# Patient Record
Sex: FEMALE | Race: WHITE | Employment: FULL TIME | URBAN - NONMETROPOLITAN AREA
[De-identification: names, ages, dates, MRNs, and addresses within clinical notes are randomized per-mention and may not be internally consistent; named-entity substitution may affect disease eponyms.]

---

## 2017-01-06 PROBLEM — G89.29 INSOMNIA SECONDARY TO CHRONIC PAIN: Status: ACTIVE | Noted: 2017-01-06

## 2017-01-06 PROBLEM — I10 ESSENTIAL HYPERTENSION: Status: ACTIVE | Noted: 2017-01-06

## 2017-01-06 PROBLEM — E03.9 HYPOTHYROIDISM: Status: ACTIVE | Noted: 2017-01-06

## 2017-01-06 PROBLEM — G43.909 MIGRAINE HEADACHE: Status: ACTIVE | Noted: 2017-01-06

## 2017-01-06 PROBLEM — G47.01 INSOMNIA SECONDARY TO CHRONIC PAIN: Status: ACTIVE | Noted: 2017-01-06

## 2017-07-14 ENCOUNTER — HOSPITAL ENCOUNTER (OUTPATIENT)
Dept: GENERAL RADIOLOGY | Age: 47
Discharge: HOME | End: 2017-07-14
Payer: COMMERCIAL

## 2017-07-14 ENCOUNTER — HOSPITAL ENCOUNTER (OUTPATIENT)
Age: 47
Discharge: HOME | End: 2017-07-14
Payer: COMMERCIAL

## 2017-07-14 DIAGNOSIS — R05.3 CHRONIC COUGH: ICD-10-CM

## 2017-07-14 PROBLEM — F41.9 ANXIETY: Status: ACTIVE | Noted: 2017-07-14

## 2017-07-14 PROCEDURE — 71020 XR CHEST STANDARD TWO VW: CPT

## 2018-01-03 ENCOUNTER — OFFICE VISIT (OUTPATIENT)
Dept: FAMILY MEDICINE CLINIC | Age: 48
End: 2018-01-03
Payer: COMMERCIAL

## 2018-01-03 VITALS
HEIGHT: 67 IN | WEIGHT: 176 LBS | SYSTOLIC BLOOD PRESSURE: 132 MMHG | BODY MASS INDEX: 27.62 KG/M2 | DIASTOLIC BLOOD PRESSURE: 84 MMHG

## 2018-01-03 DIAGNOSIS — E78.49 OTHER HYPERLIPIDEMIA: ICD-10-CM

## 2018-01-03 DIAGNOSIS — E03.8 OTHER SPECIFIED HYPOTHYROIDISM: ICD-10-CM

## 2018-01-03 DIAGNOSIS — I10 ESSENTIAL HYPERTENSION: Primary | ICD-10-CM

## 2018-01-03 PROCEDURE — 1036F TOBACCO NON-USER: CPT | Performed by: FAMILY MEDICINE

## 2018-01-03 PROCEDURE — G8482 FLU IMMUNIZE ORDER/ADMIN: HCPCS | Performed by: FAMILY MEDICINE

## 2018-01-03 PROCEDURE — 99214 OFFICE O/P EST MOD 30 MIN: CPT | Performed by: FAMILY MEDICINE

## 2018-01-03 PROCEDURE — G8427 DOCREV CUR MEDS BY ELIG CLIN: HCPCS | Performed by: FAMILY MEDICINE

## 2018-01-03 PROCEDURE — G8417 CALC BMI ABV UP PARAM F/U: HCPCS | Performed by: FAMILY MEDICINE

## 2018-01-03 RX ORDER — LOSARTAN POTASSIUM AND HYDROCHLOROTHIAZIDE 12.5; 5 MG/1; MG/1
1 TABLET ORAL DAILY
Qty: 90 TABLET | Refills: 1 | Status: SHIPPED | OUTPATIENT
Start: 2018-01-03 | End: 2018-07-05 | Stop reason: SDUPTHER

## 2018-01-03 NOTE — PATIENT INSTRUCTIONS
PLAN:  Labs reviewed with patient, I am overall pleased with these results. Her potassium level however is low, I encourage for her to increase the potassium in her diet. If this does not help, I can add a potassium supplement. I will discontinue her Chlorthalidone and start her on Losartan 50-12.5mg to help better control her triglycerides. I am not too concerned with this level at this time. I also would like for her to increase her fish oils. I will recheck a fasting lipid in a few months to see how this has changed. I suggest for her to increase her aerobic exercise.

## 2018-01-03 NOTE — PROGRESS NOTES
[address]  [place] [phone]  Dept: [phone]    Korin Rivera is a 52 y.o. female here for 6 Month Follow-Up; Hypertension; and Hyperlipidemia    HPI:  HYPERTENSION  She is not exercising and is adherent to a low-salt diet.  Blood pressure is not being monitored at home. Cardiac symptoms: none. Patient denies: chest pain, dyspnea and palpitations.     HYPERLIPIDEMIA    Medication compliance: compliant all of the time. Patient is following a low fat, low cholesterol diet. She is not exercising regularly. Prior to Admission medications    Medication Sig Start Date End Date Taking? Authorizing Provider   losartan-hydrochlorothiazide (HYZAAR) 50-12.5 MG per tablet Take 1 tablet by mouth daily 1/3/18  Yes Silvino Johnson MD   DULoxetine (CYMBALTA) 30 MG extended release capsule TAKE 1 CAPSULE BY MOUTH DAILY 12/18/17  Yes Silvino Johnson MD   sertraline (ZOLOFT) 50 MG tablet TAKE 1 TABLET BY MOUTH DAILY 10/9/17  Yes Silvino Johnson MD   simvastatin (ZOCOR) 40 MG tablet TAKE 1 TABLET BY MOUTH DAILY IN THE EVENING 10/9/17  Yes Silvino Johnson MD   Levothyroxine Sodium 100 MCG CAPS Take 1 capsule by mouth Daily 7/14/17  Yes Silvino Johnson MD   butalbital-aspirin-caffeine [facility]) -54 MG capsule Take 1 capsule by mouth every 4 hours as needed for Headaches 7/14/17  Yes Silvino Johnson MD   ALPRAZolam Denice Lisa) 0.5 MG tablet TAKE 1 TABLET BY MOUTH EVERY 6 HOURS AS NEEDED FOR SLEEP OR ANXIETY 5/17/17  Yes Silvino Johnson MD   ibuprofen (ADVIL;MOTRIN) 800 MG tablet TAKE 1 TABLET EVERY 8 HOURS AS NEEDED 5/4/16  Yes Silvino Johnson MD     ROS:  General Constitutional: Denies chills. Denies fever. Denies headache. Denies lightheadedness. Ophthalmologic: Denies blurred vision. ENT: Denies nasal congestion. Denies sore throat. Denies ear pain and pressure. Respiratory: Denies cough. Denies shortness of breath. Denies wheezing.   Cardiovascular: Denies chest pain at needed for Headaches 30 capsule 3    ALPRAZolam (XANAX) 0.5 MG tablet TAKE 1 TABLET BY MOUTH EVERY 6 HOURS AS NEEDED FOR SLEEP OR ANXIETY 30 tablet 0    ibuprofen (ADVIL;MOTRIN) 800 MG tablet TAKE 1 TABLET EVERY 8 HOURS AS NEEDED 90 tablet 0     No current facility-administered medications for this visit. No Known Allergies    PHYSICAL EXAM:    /84 (Site: Left Arm, Position: Sitting, Cuff Size: Medium Adult)   Ht 5' 7\" (1.702 m)   Wt 176 lb (79.8 kg)   LMP 09/30/2017 (Approximate)   BMI 27.57 kg/m²   Wt Readings from Last 3 Encounters:   01/03/18 176 lb (79.8 kg)   07/14/17 173 lb (78.5 kg)   01/06/17 170 lb (77.1 kg)     BP Readings from Last 3 Encounters:   01/03/18 132/84   07/14/17 122/62   01/06/17 132/72     General Appearance: in no acute distress, well developed, well nourished. Eyes: pupils equal, round reactive to light and accommodation. Ears: normal canal and TM's. Nose: nares patent, no lesions. Oral Cavity: mucosa moist.  Throat: clear. Neck/Thyroid: neck supple, full range of motion, no cervical lymphadenopathy, no thyromegaly or carotid bruits. fullness  Skin: warm and dry. No suspicious lesions. Evulsion L first finger  Heart: regular rate and rhythm. No murmurs. S1, S2 normal, no gallops. Lungs: clear to auscultation bilaterally. Abdomen: bowel sounds present, soft, nontender, nondistended, no masses or organomegaly. Musculoskeletal: normal, full range of motion in knees and hips, no swelling or tenderness. Extremities: no cyanosis or edema. Peripheral Pulses: 2+ throughout, symetric. Neurologic: nonfocal, motor strength normal upper and lower extremities, sensory exam intact. Psych: normal affect, speech fluent. ASSESSMENT:  1. Essential hypertension     2. Other hyperlipidemia  Basic Metabolic Panel    Lipid Panel    ALT    AST    Basic Metabolic Panel    CBC    CRP,High Sensitivity    Lipid Panel   3.  Other specified hypothyroidism  T4    TSH without Reflex PLAN:  Labs reviewed with patient, I am overall pleased with these results. Her potassium level however is low, I encourage for her to increase the potassium in her diet. If this does not help, I can add a potassium supplement. I will discontinue her Chlorthalidone and start her on Losartan 50-12.5mg to help better control her triglycerides. I am not too concerned with this level at this time. I also would like for her to increase her fish oils. I will recheck a fasting lipid in a few months to see how this has changed. I suggest for her to increase her aerobic exercise. Orders Placed This Encounter   Procedures    Basic Metabolic Panel     Standing Status:   Future     Standing Expiration Date:   1/3/2019    Lipid Panel     Standing Status:   Future     Standing Expiration Date:   1/3/2019     Order Specific Question:   Is Patient Fasting?/# of Hours     Answer:   no fasting required    ALT     Standing Status:   Future     Standing Expiration Date:   1/3/2019    AST     Standing Status:   Future     Standing Expiration Date:   1/3/2019    Basic Metabolic Panel     Standing Status:   Future     Standing Expiration Date:   1/3/2019    CBC     Standing Status:   Future     Standing Expiration Date:   1/3/2019    CRP,High Sensitivity     Standing Status:   Future     Standing Expiration Date:   1/3/2019    Lipid Panel     Standing Status:   Future     Standing Expiration Date:   1/3/2019     Order Specific Question:   Is Patient Fasting?/# of Hours     Answer:   no fasting required    T4     Standing Status:   Future     Standing Expiration Date:   1/3/2019    TSH without Reflex     Standing Status:   Future     Standing Expiration Date:   1/3/2019     Orders Placed This Encounter   Medications    losartan-hydrochlorothiazide (HYZAAR) 50-12.5 MG per tablet     Sig: Take 1 tablet by mouth daily     Dispense:  90 tablet     Refill:  1     Scribed by: JENNIFER Porter, [id number]

## 2018-01-23 ENCOUNTER — HOSPITAL ENCOUNTER (OUTPATIENT)
Dept: WOMENS IMAGING | Age: 48
Discharge: HOME | End: 2018-01-23
Payer: COMMERCIAL

## 2018-01-23 DIAGNOSIS — Z12.39 SCREENING BREAST EXAMINATION: ICD-10-CM

## 2018-01-23 PROCEDURE — 77067 SCR MAMMO BI INCL CAD: CPT

## 2018-04-04 LAB
ANION GAP SERPL CALCULATED.3IONS-SCNC: 16 MEQ/L (ref 10–19)
BUN BLDV-MCNC: 15 MG/DL (ref 8–23)
CALCIUM SERPL-MCNC: 9.9 MG/DL (ref 8.5–10.5)
CHLORIDE BLD-SCNC: 98 MEQ/L (ref 95–107)
CHOLESTEROL/HDL RATIO: 3.8
CHOLESTEROL: 196 MG/DL
CO2: 28 MEQ/L (ref 19–31)
CREAT SERPL-MCNC: 0.7 MG/DL (ref 0.6–1.3)
EGFR AFRICAN AMERICAN: 119.6 ML/MIN/1.73 M2
EGFR IF NONAFRICAN AMERICAN: 103.2 ML/MIN/1.73 M2
GLUCOSE: 73 MG/DL (ref 70–99)
HDLC SERPL-MCNC: 51 MG/DL
LDL CHOLESTEROL CALCULATED: 83 MG/DL
LDL/HDL RATIO: 1.6
POTASSIUM SERPL-SCNC: 4.1 MEQ/L (ref 3.5–5.4)
SODIUM BLD-SCNC: 142 MEQ/L (ref 135–146)
TRIGL SERPL-MCNC: 308 MG/DL
VLDLC SERPL CALC-MCNC: 62 MG/DL

## 2018-06-29 LAB
ABSOLUTE BASO #: 0.1 K/UL (ref 0–0.1)
ABSOLUTE EOS #: 0.3 K/UL (ref 0.1–0.4)
ABSOLUTE LYMPH #: 2.1 K/UL (ref 0.8–5.2)
ABSOLUTE MONO #: 0.4 K/UL (ref 0.1–0.9)
ABSOLUTE NEUT #: 3.7 K/UL (ref 1.3–9.1)
ALT SERPL-CCNC: 24 U/L (ref 5–40)
ANION GAP SERPL CALCULATED.3IONS-SCNC: 14 MEQ/L (ref 10–19)
AST SERPL-CCNC: 21 U/L (ref 9–40)
BASOPHILS RELATIVE PERCENT: 1.2 %
BUN BLDV-MCNC: 13 MG/DL (ref 8–23)
CALCIUM SERPL-MCNC: 9.8 MG/DL (ref 8.5–10.5)
CHLORIDE BLD-SCNC: 98 MEQ/L (ref 95–107)
CHOLESTEROL/HDL RATIO: 3.7
CHOLESTEROL: 196 MG/DL
CO2: 30 MEQ/L (ref 19–31)
CREAT SERPL-MCNC: 0.7 MG/DL (ref 0.6–1.3)
EGFR AFRICAN AMERICAN: 118.7 ML/MIN/1.73 M2
EGFR IF NONAFRICAN AMERICAN: 102.5 ML/MIN/1.73 M2
EOSINOPHILS RELATIVE PERCENT: 4.4 %
GLUCOSE: 100 MG/DL (ref 70–99)
HCT VFR BLD CALC: 44.5 % (ref 36–48)
HDLC SERPL-MCNC: 53 MG/DL
HEMOGLOBIN: 15.3 G/DL (ref 12–16)
HIGH SENSITIVE C-REACTIVE PROTEIN: 1.49 MG/L
LDL CHOLESTEROL CALCULATED: 90 MG/DL
LDL/HDL RATIO: 1.7
LYMPHOCYTE %: 32.1 %
MCH RBC QN AUTO: 30.5 PG (ref 27–34)
MCHC RBC AUTO-ENTMCNC: 34.4 G/DL (ref 31–36)
MCV RBC AUTO: 88.8 FL (ref 80–100)
MONOCYTES # BLD: 6.3 %
NEUTROPHILS RELATIVE PERCENT: 55.8 %
PDW BLD-RTO: 12.3 % (ref 10.8–14.8)
PLATELETS: 306 K/UL (ref 150–450)
POTASSIUM SERPL-SCNC: 4.3 MEQ/L (ref 3.5–5.4)
RBC: 5.01 M/UL (ref 4–5.5)
SODIUM BLD-SCNC: 142 MEQ/L (ref 135–146)
T4 TOTAL: 8.2 UG/DL (ref 4.5–12)
TRIGL SERPL-MCNC: 264 MG/DL
TSH SERPL DL<=0.05 MIU/L-ACNC: 1.55 UIU/ML (ref 0.4–4.1)
VLDLC SERPL CALC-MCNC: 53 MG/DL
WBC: 6.7 K/UL (ref 3.7–10.8)

## 2018-07-06 ENCOUNTER — OFFICE VISIT (OUTPATIENT)
Dept: FAMILY MEDICINE CLINIC | Age: 48
End: 2018-07-06
Payer: COMMERCIAL

## 2018-07-06 VITALS
WEIGHT: 181.4 LBS | BODY MASS INDEX: 28.47 KG/M2 | HEIGHT: 67 IN | DIASTOLIC BLOOD PRESSURE: 64 MMHG | SYSTOLIC BLOOD PRESSURE: 112 MMHG

## 2018-07-06 DIAGNOSIS — E78.49 OTHER HYPERLIPIDEMIA: ICD-10-CM

## 2018-07-06 DIAGNOSIS — G47.33 OSA (OBSTRUCTIVE SLEEP APNEA): ICD-10-CM

## 2018-07-06 DIAGNOSIS — I10 ESSENTIAL HYPERTENSION: Primary | ICD-10-CM

## 2018-07-06 DIAGNOSIS — E03.8 OTHER SPECIFIED HYPOTHYROIDISM: ICD-10-CM

## 2018-07-06 PROCEDURE — G8427 DOCREV CUR MEDS BY ELIG CLIN: HCPCS | Performed by: FAMILY MEDICINE

## 2018-07-06 PROCEDURE — 1036F TOBACCO NON-USER: CPT | Performed by: FAMILY MEDICINE

## 2018-07-06 PROCEDURE — G8417 CALC BMI ABV UP PARAM F/U: HCPCS | Performed by: FAMILY MEDICINE

## 2018-07-06 PROCEDURE — 99214 OFFICE O/P EST MOD 30 MIN: CPT | Performed by: FAMILY MEDICINE

## 2018-07-06 RX ORDER — LOSARTAN POTASSIUM AND HYDROCHLOROTHIAZIDE 12.5; 5 MG/1; MG/1
TABLET ORAL
Qty: 90 TABLET | Refills: 3 | Status: SHIPPED | OUTPATIENT
Start: 2018-07-06 | End: 2019-07-04 | Stop reason: SDUPTHER

## 2018-07-06 ASSESSMENT — PATIENT HEALTH QUESTIONNAIRE - PHQ9
2. FEELING DOWN, DEPRESSED OR HOPELESS: 0
1. LITTLE INTEREST OR PLEASURE IN DOING THINGS: 0
SUM OF ALL RESPONSES TO PHQ QUESTIONS 1-9: 0
SUM OF ALL RESPONSES TO PHQ9 QUESTIONS 1 & 2: 0

## 2018-07-06 NOTE — PROGRESS NOTES
I, MADELEINE Mena, am scribing for and in the presence of Dr. Zoila Wood. 07/06/189:27 am [facility]  [address]  [place] [phone]  Dept: [phone]    Ramona Cummings is a 50 y.o. female here for 6 Month Follow-Up; Hypertension; and Hyperlipidemia      HPI:  HYPERTENSION:  Medication compliance: compliant all of the time. Medication Therapy: losartan HCTZ  She is not exercising. She is adherent to a low-sodium diet. Blood pressure is not being monitored at home. Patient reports that She has limited alcohol intake. Does the patient have sleep apnea? Yes, did not tolerate the cpap  The patient's most recent LDL is below 190, which was checked on 06/28/18. HYPERLIPIDEMIA:  Medication compliance: compliant all of the time. Medication Therapy: simvastatin (Zocor), fish oil  Patient is  following a low fat, low cholesterol diet. She is not exercising regularly. Prior to Admission medications    Medication Sig Start Date End Date Taking?  Authorizing Provider   levothyroxine (SYNTHROID) 100 MCG tablet TAKE 1 TABLET BY MOUTH EVERY DAY 6/21/18  Yes Zoila Wood MD   ibuprofen (ADVIL;MOTRIN) 800 MG tablet TAKE 1 TABLET EVERY 8 HOURS AS NEEDED 4/12/18  Yes MD zulay Cruzalbital-acetaminophen-caffeine (FIORICET, ESGIC) -22 MG per tablet TAKE 1 TABLET BY MOUTH DAILY AS NEEDED FOR PAIN 4/9/18  Yes Zoila Wood MD   DULoxetine (CYMBALTA) 30 MG extended release capsule TAKE 1 CAPSULE BY MOUTH DAILY 12/18/17  Yes Zoila Wood MD   sertraline (ZOLOFT) 50 MG tablet TAKE 1 TABLET BY MOUTH DAILY 10/9/17  Yes Zoila Wood MD   simvastatin (ZOCOR) 40 MG tablet TAKE 1 TABLET BY MOUTH DAILY IN THE EVENING 10/9/17  Yes Zoila Wood MD   losartan-hydrochlorothiazide (HYZAAR) 50-12.5 MG per tablet TAKE 1 TABLET BY MOUTH EVERY DAY 7/6/18   MD zulay Cruzalbital-aspirin-caffeine [facility]) -60 MG capsule Take 1 capsule by mouth General Appearance: in no acute distress, well developed, well nourished. Eyes: pupils equal, round reactive to light and accommodation. Ears: normal canal and TM's. Nose: nares patent, no lesions. Oral Cavity: mucosa moist.  Throat: clear. Neck/Thyroid: neck supple, full range of motion, no cervical lymphadenopathy, no thyromegaly or carotid bruits. Skin: warm and dry. No suspicious lesions. Heart: regular rate and rhythm. No murmurs. S1, S2 normal, no gallops. Rate: 85.   Lungs: clear to auscultation bilaterally. Abdomen: bowel sounds present, soft, nontender, nondistended, no masses or organomegaly. Musculoskeletal: normal, full range of motion in knees and hips, no swelling or tenderness. Extremities: no cyanosis or edema. Peripheral Pulses: 2+ throughout, symetric. Neurologic: nonfocal, motor strength normal upper and lower extremities, sensory exam intact. Psych: normal affect, speech fluent. ASSESSMENT:   Diagnosis Orders   1. Essential hypertension     2. Other hyperlipidemia  ALT    AST    Basic Metabolic Panel    CBC    CRP,High Sensitivity    Lipid Panel   3. Other specified hypothyroidism  T4    TSH without Reflex   4. DANNY (obstructive sleep apnea)  Sleep Study with PAP Titration       PLAN:  Tdap: Chip Armando, [facility]. I commend her on utilizing meditation for stress relief and relaxation. I also encourage her to try yoga to help with relaxation and mindfulness. We also discuss the relaxation effects of aerobic activity. We discuss her sleep habits. Short sleep latency indicates sleep deprivation. The problem is if she is not getting effective sleep with 8-10 hours. It has been several years since she used a cpap. The technology of these machines have changed quite a bit since that time. I would recommend that she have a repeat sleep study and be fitted again to see if she can tolerate this any better.       We also review what she can do to decrease her risks for developing dementia. I encourage her to improve mindfulness and do the things necessary for heart health. I review her lab results. Her triglycerides continue to be elevated but have come down. I encourage her to restrict carbs and increase aerobic activity to help with this. All other labs look okay. I will see her back in 6 months. Orders Placed This Encounter   Procedures    ALT     Standing Status:   Future     Standing Expiration Date:   7/6/2019    AST     Standing Status:   Future     Standing Expiration Date:   7/6/2019    Basic Metabolic Panel     Standing Status:   Future     Standing Expiration Date:   7/6/2019    CBC     Standing Status:   Future     Standing Expiration Date:   7/6/2019   Razia Slimmer CRP,High Sensitivity     Standing Status:   Future     Standing Expiration Date:   7/6/2019    Lipid Panel     Standing Status:   Future     Standing Expiration Date:   7/6/2019     Order Specific Question:   Is Patient Fasting?/# of Hours     Answer:   no fasting required    T4     Standing Status:   Future     Standing Expiration Date:   7/6/2019    TSH without Reflex     Standing Status:   Future     Standing Expiration Date:   7/6/2019    Sleep Study with PAP Titration     Standing Status:   Future     Standing Expiration Date:   7/6/2019     Order Specific Question:   Sleep Study Titration Type     Answer:   CPAP     Order Specific Question:   Location For Sleep Study     Answer:   [facility]     Order Specific Question:   Select a sleep lab location     Answer:   [facility]     No orders of the defined types were placed in this encounter. I, Dr. Romana Dinning, personally performed the services described in this documentation as scribed by MADELEINE Mendiola in my presence, and it is both accurate and complete.

## 2018-07-10 DIAGNOSIS — G47.33 OSA (OBSTRUCTIVE SLEEP APNEA): Primary | ICD-10-CM

## 2018-11-06 ENCOUNTER — OFFICE VISIT (OUTPATIENT)
Dept: FAMILY MEDICINE CLINIC | Age: 48
End: 2018-11-06
Payer: COMMERCIAL

## 2018-11-06 VITALS
BODY MASS INDEX: 29.19 KG/M2 | HEIGHT: 67 IN | DIASTOLIC BLOOD PRESSURE: 86 MMHG | SYSTOLIC BLOOD PRESSURE: 132 MMHG | WEIGHT: 186 LBS

## 2018-11-06 DIAGNOSIS — G47.33 OSA (OBSTRUCTIVE SLEEP APNEA): ICD-10-CM

## 2018-11-06 DIAGNOSIS — F33.9 EPISODE OF RECURRENT MAJOR DEPRESSIVE DISORDER, UNSPECIFIED DEPRESSION EPISODE SEVERITY (HCC): Primary | ICD-10-CM

## 2018-11-06 DIAGNOSIS — G47.00 INSOMNIA, UNSPECIFIED TYPE: ICD-10-CM

## 2018-11-06 DIAGNOSIS — Z13.31 POSITIVE DEPRESSION SCREENING: ICD-10-CM

## 2018-11-06 DIAGNOSIS — F41.9 ANXIETY: ICD-10-CM

## 2018-11-06 PROCEDURE — 1036F TOBACCO NON-USER: CPT | Performed by: FAMILY MEDICINE

## 2018-11-06 PROCEDURE — G8417 CALC BMI ABV UP PARAM F/U: HCPCS | Performed by: FAMILY MEDICINE

## 2018-11-06 PROCEDURE — G8484 FLU IMMUNIZE NO ADMIN: HCPCS | Performed by: FAMILY MEDICINE

## 2018-11-06 PROCEDURE — 99214 OFFICE O/P EST MOD 30 MIN: CPT | Performed by: FAMILY MEDICINE

## 2018-11-06 PROCEDURE — G8427 DOCREV CUR MEDS BY ELIG CLIN: HCPCS | Performed by: FAMILY MEDICINE

## 2018-11-06 PROCEDURE — G8431 POS CLIN DEPRES SCRN F/U DOC: HCPCS | Performed by: FAMILY MEDICINE

## 2018-11-06 RX ORDER — ALPRAZOLAM 0.25 MG/1
0.12 TABLET ORAL 3 TIMES DAILY PRN
Qty: 60 TABLET | Refills: 0 | Status: SHIPPED | OUTPATIENT
Start: 2018-11-06 | End: 2019-02-02 | Stop reason: SDUPTHER

## 2018-11-06 RX ORDER — ALPRAZOLAM 0.5 MG/1
.25-.5 TABLET ORAL NIGHTLY PRN
COMMUNITY
End: 2019-12-13 | Stop reason: SDUPTHER

## 2018-11-06 ASSESSMENT — PATIENT HEALTH QUESTIONNAIRE - PHQ9
2. FEELING DOWN, DEPRESSED OR HOPELESS: 3
1. LITTLE INTEREST OR PLEASURE IN DOING THINGS: 3
3. TROUBLE FALLING OR STAYING ASLEEP: 3
6. FEELING BAD ABOUT YOURSELF - OR THAT YOU ARE A FAILURE OR HAVE LET YOURSELF OR YOUR FAMILY DOWN: 3
4. FEELING TIRED OR HAVING LITTLE ENERGY: 3
SUM OF ALL RESPONSES TO PHQ QUESTIONS 1-9: 18
7. TROUBLE CONCENTRATING ON THINGS, SUCH AS READING THE NEWSPAPER OR WATCHING TELEVISION: 3
9. THOUGHTS THAT YOU WOULD BE BETTER OFF DEAD, OR OF HURTING YOURSELF: 0
8. MOVING OR SPEAKING SO SLOWLY THAT OTHER PEOPLE COULD HAVE NOTICED. OR THE OPPOSITE, BEING SO FIGETY OR RESTLESS THAT YOU HAVE BEEN MOVING AROUND A LOT MORE THAN USUAL: 0
SUM OF ALL RESPONSES TO PHQ QUESTIONS 1-9: 18
5. POOR APPETITE OR OVEREATING: 0
10. IF YOU CHECKED OFF ANY PROBLEMS, HOW DIFFICULT HAVE THESE PROBLEMS MADE IT FOR YOU TO DO YOUR WORK, TAKE CARE OF THINGS AT HOME, OR GET ALONG WITH OTHER PEOPLE: 2
SUM OF ALL RESPONSES TO PHQ9 QUESTIONS 1 & 2: 6

## 2018-11-27 ENCOUNTER — OFFICE VISIT (OUTPATIENT)
Dept: FAMILY MEDICINE CLINIC | Age: 48
End: 2018-11-27
Payer: COMMERCIAL

## 2018-11-27 VITALS
WEIGHT: 186 LBS | DIASTOLIC BLOOD PRESSURE: 76 MMHG | BODY MASS INDEX: 29.19 KG/M2 | SYSTOLIC BLOOD PRESSURE: 120 MMHG | HEIGHT: 67 IN

## 2018-11-27 DIAGNOSIS — F32.4 MAJOR DEPRESSIVE DISORDER IN PARTIAL REMISSION, UNSPECIFIED WHETHER RECURRENT (HCC): ICD-10-CM

## 2018-11-27 DIAGNOSIS — G47.33 OSA (OBSTRUCTIVE SLEEP APNEA): Primary | ICD-10-CM

## 2018-11-27 PROCEDURE — 99213 OFFICE O/P EST LOW 20 MIN: CPT | Performed by: FAMILY MEDICINE

## 2018-11-27 PROCEDURE — 1036F TOBACCO NON-USER: CPT | Performed by: FAMILY MEDICINE

## 2018-11-27 PROCEDURE — G8427 DOCREV CUR MEDS BY ELIG CLIN: HCPCS | Performed by: FAMILY MEDICINE

## 2018-11-27 PROCEDURE — G8417 CALC BMI ABV UP PARAM F/U: HCPCS | Performed by: FAMILY MEDICINE

## 2018-11-27 PROCEDURE — G8484 FLU IMMUNIZE NO ADMIN: HCPCS | Performed by: FAMILY MEDICINE

## 2018-11-27 ASSESSMENT — PATIENT HEALTH QUESTIONNAIRE - PHQ9
SUM OF ALL RESPONSES TO PHQ QUESTIONS 1-9: 0
1. LITTLE INTEREST OR PLEASURE IN DOING THINGS: 0
2. FEELING DOWN, DEPRESSED OR HOPELESS: 0
SUM OF ALL RESPONSES TO PHQ QUESTIONS 1-9: 0
SUM OF ALL RESPONSES TO PHQ9 QUESTIONS 1 & 2: 0

## 2018-11-27 NOTE — PROGRESS NOTES
mouth every 4 hours as needed for Headaches 30 capsule 3     No current facility-administered medications for this visit. ROS:  Denies panic attacks. Denies racing thoughts. Denies depressed mood. Admits anxiety is greatly improved. Admits sleeps but doesn't feel rested. EXAM:  /76   Ht 5' 7\" (1.702 m)   Wt 186 lb (84.4 kg)   LMP 07/21/2018 (Approximate)   BMI 29.13 kg/m²   Wt Readings from Last 3 Encounters:   11/27/18 186 lb (84.4 kg)   11/06/18 186 lb (84.4 kg)   07/06/18 181 lb 6.4 oz (82.3 kg)     BP Readings from Last 3 Encounters:   11/27/18 120/76   11/06/18 132/86   07/06/18 112/64     PHYSICAL EXAM:  General Appearance: in no acute distress, well developed, well nourished. Eyes: pupils equal, round reactive to light and accommodation. Ears: normal canal and TM's. Nose: nares patent, no lesions. Oral Cavity: mucosa moist.  Throat: clear. Neck/Thyroid: neck supple, full range of motion, no cervical lymphadenopathy, no thyromegaly or carotid bruits. Skin: warm and dry. No suspicious lesions. Heart: regular rate and rhythm. No murmurs. S1, S2 normal, no gallops. Lungs: clear to auscultation bilaterally. Abdomen: bowel sounds present, soft, nontender, nondistended, no masses or organomegaly. Musculoskeletal: normal, full range of motion in knees and hips, no swelling or tenderness. Extremities: no cyanosis or edema. Peripheral Pulses: 2+ throughout, symetric. Neurologic: nonfocal, motor strength normal upper and lower extremities, sensory exam intact. Psych: normal affect, speech fluent. ASSESSMENT:   Diagnosis Orders   1. DANNY (obstructive sleep apnea)     2. 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 She would like to taper back down to the 75 mg dose.  I am agreeable with this but I recommend that she stay on

## 2018-12-07 DIAGNOSIS — F33.9 EPISODE OF RECURRENT MAJOR DEPRESSIVE DISORDER, UNSPECIFIED DEPRESSION EPISODE SEVERITY (HCC): ICD-10-CM

## 2018-12-07 DIAGNOSIS — F32.4 MAJOR DEPRESSIVE DISORDER IN PARTIAL REMISSION, UNSPECIFIED WHETHER RECURRENT (HCC): Primary | ICD-10-CM

## 2018-12-07 RX ORDER — DULOXETIN HYDROCHLORIDE 30 MG/1
CAPSULE, DELAYED RELEASE ORAL
Qty: 90 CAPSULE | Refills: 3 | Status: SHIPPED | OUTPATIENT
Start: 2018-12-07 | End: 2020-01-03

## 2018-12-07 NOTE — TELEPHONE ENCOUNTER
Cymbalta refill to [facility]    Health Maintenance   Topic Date Due    HIV screen  05/09/1985    DTaP/Tdap/Td vaccine (1 - Tdap) 05/09/1989    Cervical cancer screen  01/06/2018    Flu vaccine (1) 09/01/2018    TSH testing  06/28/2019    Potassium monitoring  06/28/2019    Creatinine monitoring  06/28/2019    Lipid screen  06/28/2023             (applicable per patient's age: Cancer Screenings, Depression Screening, Fall Risk Screening, Immunizations)    Hemoglobin A1C (%)   Date Value   07/01/2016 5.4     LDL Cholesterol (mg/dL)   Date Value   08/28/2014 81     LDL Calculated (mg/dL)   Date Value   06/28/2018 90     AST (U/L)   Date Value   06/28/2018 21     ALT (U/L)   Date Value   06/28/2018 24     BUN (mg/dL)   Date Value   06/28/2018 13      (goal A1C is < 7)   (goal LDL is <100) need 30-50% reduction from baseline     BP Readings from Last 3 Encounters:   11/27/18 120/76   11/06/18 132/86   07/06/18 112/64    (goal /80)      All Future Testing planned in [facility]:  Lab Frequency Next Occurrence   Basic Metabolic Panel Once 47/00/7628   Lipid Panel Once 04/03/2018   ALT Once 06/28/2018   AST Once 95/57/1531   Basic Metabolic Panel Once 36/57/0285   CBC Once 06/28/2018   CRP,High Sensitivity Once 06/28/2018   Lipid Panel Once 06/28/2018   T4 Once 06/28/2018   TSH without Reflex Once 06/28/2018   ALT Once 12/28/2018   AST Once 49/82/5465   Basic Metabolic Panel Once 05/46/1408   CBC Once 12/28/2018   CRP,High Sensitivity Once 12/28/2018   Lipid Panel Once 12/28/2018   T4 Once 12/28/2018   TSH without Reflex Once 12/28/2018   Baseline Diagnostic Sleep Study Once 07/17/2018       Next Visit Date:  Future Appointments  Date Time Provider Jeovany Mcmahon   1/4/2019 9:00 AM [facility]            Patient Active Problem List:     Hypertension     Hyperlipidemia     Insomnia     Essential hypertension     Hypothyroidism     Migraine headache     Chronic cough     Anxiety     DANNY

## 2019-01-03 LAB
ABSOLUTE BASO #: 0.1 K/UL (ref 0–0.1)
ABSOLUTE EOS #: 0.3 K/UL (ref 0.1–0.4)
ABSOLUTE LYMPH #: 2.3 K/UL (ref 0.8–5.2)
ABSOLUTE MONO #: 0.7 K/UL (ref 0.1–0.9)
ABSOLUTE NEUT #: 5.1 K/UL (ref 1.3–9.1)
ALT SERPL-CCNC: 31 U/L (ref 5–40)
ANION GAP SERPL CALCULATED.3IONS-SCNC: 13 MEQ/L (ref 10–19)
AST SERPL-CCNC: 25 U/L (ref 9–40)
BASOPHILS RELATIVE PERCENT: 0.7 %
BUN BLDV-MCNC: 16 MG/DL (ref 8–23)
CALCIUM SERPL-MCNC: 10.2 MG/DL (ref 8.5–10.5)
CHLORIDE BLD-SCNC: 102 MEQ/L (ref 95–107)
CHOLESTEROL/HDL RATIO: 3.9
CHOLESTEROL: 183 MG/DL
CO2: 27 MEQ/L (ref 19–31)
CREAT SERPL-MCNC: 0.8 MG/DL (ref 0.6–1.3)
EGFR AFRICAN AMERICAN: 101 ML/MIN/1.73 M2
EGFR IF NONAFRICAN AMERICAN: 87.2 ML/MIN/1.73 M2
EOSINOPHILS RELATIVE PERCENT: 3 %
GLUCOSE: 110 MG/DL (ref 70–99)
HCT VFR BLD CALC: 42.9 % (ref 36–48)
HDLC SERPL-MCNC: 46.8 MG/DL
HEMOGLOBIN: 14.8 G/DL (ref 12–16)
HIGH SENSITIVE C-REACTIVE PROTEIN: 1.32 MG/L
LDL CHOLESTEROL CALCULATED: 62 MG/DL
LDL/HDL RATIO: 1.3
LYMPHOCYTE %: 26.7 %
MCH RBC QN AUTO: 30.8 PG (ref 27–34)
MCHC RBC AUTO-ENTMCNC: 34.5 G/DL (ref 31–36)
MCV RBC AUTO: 89.2 FL (ref 80–100)
MONOCYTES # BLD: 8.7 %
NEUTROPHILS RELATIVE PERCENT: 60.5 %
PDW BLD-RTO: 12.4 % (ref 10.8–14.8)
PLATELETS: 341 K/UL (ref 150–450)
POTASSIUM SERPL-SCNC: 4.5 MEQ/L (ref 3.5–5.4)
RBC: 4.81 M/UL (ref 4–5.5)
SODIUM BLD-SCNC: 142 MEQ/L (ref 135–146)
T4 TOTAL: 7.7 UG/DL (ref 4.5–12)
TRIGL SERPL-MCNC: 372 MG/DL
TSH SERPL DL<=0.05 MIU/L-ACNC: 2.72 UIU/ML (ref 0.4–4.1)
VLDLC SERPL CALC-MCNC: 74 MG/DL
WBC: 8.5 K/UL (ref 3.7–10.8)

## 2019-01-04 ENCOUNTER — OFFICE VISIT (OUTPATIENT)
Dept: FAMILY MEDICINE CLINIC | Age: 49
End: 2019-01-04
Payer: COMMERCIAL

## 2019-01-04 VITALS
BODY MASS INDEX: 28.56 KG/M2 | HEIGHT: 67 IN | SYSTOLIC BLOOD PRESSURE: 122 MMHG | DIASTOLIC BLOOD PRESSURE: 64 MMHG | WEIGHT: 182 LBS

## 2019-01-04 DIAGNOSIS — R73.9 HYPERGLYCEMIA: ICD-10-CM

## 2019-01-04 DIAGNOSIS — F32.4 MAJOR DEPRESSIVE DISORDER IN PARTIAL REMISSION, UNSPECIFIED WHETHER RECURRENT (HCC): ICD-10-CM

## 2019-01-04 DIAGNOSIS — E78.49 OTHER HYPERLIPIDEMIA: ICD-10-CM

## 2019-01-04 DIAGNOSIS — I10 ESSENTIAL HYPERTENSION: Primary | ICD-10-CM

## 2019-01-04 DIAGNOSIS — E03.8 OTHER SPECIFIED HYPOTHYROIDISM: ICD-10-CM

## 2019-01-04 DIAGNOSIS — G47.33 OSA (OBSTRUCTIVE SLEEP APNEA): ICD-10-CM

## 2019-01-04 PROCEDURE — 1036F TOBACCO NON-USER: CPT | Performed by: FAMILY MEDICINE

## 2019-01-04 PROCEDURE — G8417 CALC BMI ABV UP PARAM F/U: HCPCS | Performed by: FAMILY MEDICINE

## 2019-01-04 PROCEDURE — G8427 DOCREV CUR MEDS BY ELIG CLIN: HCPCS | Performed by: FAMILY MEDICINE

## 2019-01-04 PROCEDURE — G8484 FLU IMMUNIZE NO ADMIN: HCPCS | Performed by: FAMILY MEDICINE

## 2019-01-04 PROCEDURE — 99214 OFFICE O/P EST MOD 30 MIN: CPT | Performed by: FAMILY MEDICINE

## 2019-02-02 DIAGNOSIS — F41.9 ANXIETY: ICD-10-CM

## 2019-02-02 DIAGNOSIS — F33.9 EPISODE OF RECURRENT MAJOR DEPRESSIVE DISORDER, UNSPECIFIED DEPRESSION EPISODE SEVERITY (HCC): ICD-10-CM

## 2019-02-02 DIAGNOSIS — Z13.31 POSITIVE DEPRESSION SCREENING: ICD-10-CM

## 2019-02-04 RX ORDER — ALPRAZOLAM 0.25 MG/1
TABLET ORAL
Qty: 60 TABLET | Refills: 0 | Status: SHIPPED | OUTPATIENT
Start: 2019-02-04 | End: 2019-12-13 | Stop reason: SDUPTHER

## 2019-02-04 RX ORDER — IBUPROFEN 800 MG/1
TABLET ORAL
Qty: 90 TABLET | Refills: 0 | Status: SHIPPED | OUTPATIENT
Start: 2019-02-04 | End: 2021-04-19 | Stop reason: SDUPTHER

## 2019-02-04 RX ORDER — BUTALBITAL, ACETAMINOPHEN AND CAFFEINE 50; 325; 40 MG/1; MG/1; MG/1
TABLET ORAL
Qty: 30 TABLET | Refills: 0 | Status: SHIPPED | OUTPATIENT
Start: 2019-02-04 | End: 2019-08-09 | Stop reason: ALTCHOICE

## 2019-02-27 DIAGNOSIS — F32.4 MAJOR DEPRESSIVE DISORDER IN PARTIAL REMISSION, UNSPECIFIED WHETHER RECURRENT (HCC): Primary | ICD-10-CM

## 2019-03-04 RX ORDER — IBUPROFEN 800 MG/1
TABLET ORAL
Qty: 90 TABLET | Refills: 0 | Status: CANCELLED | OUTPATIENT
Start: 2019-03-04

## 2019-07-05 RX ORDER — LOSARTAN POTASSIUM AND HYDROCHLOROTHIAZIDE 12.5; 5 MG/1; MG/1
TABLET ORAL
Qty: 90 TABLET | Refills: 3 | Status: SHIPPED | OUTPATIENT
Start: 2019-07-05 | End: 2019-08-01 | Stop reason: ALTCHOICE

## 2019-08-01 RX ORDER — HYDROCHLOROTHIAZIDE 12.5 MG/1
12.5 TABLET ORAL DAILY
Qty: 90 TABLET | Refills: 3 | Status: SHIPPED | OUTPATIENT
Start: 2019-08-01 | End: 2020-06-29

## 2019-08-01 RX ORDER — LOSARTAN POTASSIUM 50 MG/1
50 TABLET ORAL DAILY
Qty: 90 TABLET | Refills: 3 | Status: SHIPPED | OUTPATIENT
Start: 2019-08-01 | End: 2020-07-23

## 2019-08-05 LAB
ABSOLUTE BASO #: 0.1 X10E9/L (ref 0–0.9)
ABSOLUTE EOS #: 0.3 X10E9/L (ref 0–0.4)
ABSOLUTE LYMPH #: 1.9 X10E9/L (ref 1–3.5)
ABSOLUTE MONO #: 0.6 X10E9/L (ref 0–0.9)
ABSOLUTE NEUT #: 4.7 X10E9/L (ref 1.5–6.6)
ALT SERPL-CCNC: 18 U/L (ref 0–31)
ANION GAP SERPL CALCULATED.3IONS-SCNC: 10 MMOL/L (ref 4–12)
AST SERPL-CCNC: 17 U/L (ref 0–41)
AVERAGE GLUCOSE: 103 MG/DL
BASOPHILS RELATIVE PERCENT: 1 %
BUN BLDV-MCNC: 18 MG/DL (ref 5–23)
CALCIUM SERPL-MCNC: 9.2 MG/DL (ref 8.5–10.5)
CHLORIDE BLD-SCNC: 107 MMOL/L (ref 98–109)
CO2: 24 MMOL/L (ref 22–32)
CREAT SERPL-MCNC: 0.81 MG/DL (ref 0.4–1)
EGFR AFRICAN AMERICAN: >60 ML/MIN/1.73SQ.M
EGFR IF NONAFRICAN AMERICAN: >60 ML/MIN/1.73SQ.M
EOSINOPHILS RELATIVE PERCENT: 4.5 %
GLUCOSE: 95 MG/DL (ref 65–99)
HBA1C MFR BLD: 5.2 % (ref 4.4–6.4)
HCT VFR BLD CALC: 43.3 % (ref 35–47)
HEMOGLOBIN: 15 G/DL (ref 11.7–16)
HIGH SENSITIVE C-REACTIVE PROTEIN: 0.14 MG/DL (ref 0–0.74)
LYMPHOCYTE %: 24.8 %
MCH RBC QN AUTO: 31.8 PG (ref 26–33.5)
MCHC RBC AUTO-ENTMCNC: 34.6 G/DL (ref 32–36)
MCV RBC AUTO: 92 FL (ref 81–100)
MONOCYTES # BLD: 7.5 %
NEUTROPHILS RELATIVE PERCENT: 62.2 %
PDW BLD-RTO: 13 % (ref 11.5–14.7)
PLATELETS: 230 X10E9/L (ref 150–450)
PMV BLD AUTO: 10.2 FL (ref 7–12)
POTASSIUM SERPL-SCNC: 4.3 MMOL/L (ref 3.5–5)
RBC: 4.7 X10E12/L (ref 3.8–5.2)
SODIUM BLD-SCNC: 141 MMOL/L (ref 134–146)
T4 TOTAL: 7.3 UG/DL (ref 6.1–12.2)
TSH SERPL DL<=0.05 MIU/L-ACNC: 1.74 UIU/ML (ref 0.49–4.67)
WBC: 7.5 X10E9/L (ref 4.8–10.8)

## 2019-08-05 RX ORDER — LEVOTHYROXINE SODIUM 0.1 MG/1
TABLET ORAL
Qty: 90 TABLET | Refills: 3 | Status: SHIPPED | OUTPATIENT
Start: 2019-08-05 | End: 2020-06-29

## 2019-08-07 LAB — VITAMIN D 25-HYDROXY: 24 NG/ML

## 2019-08-09 ENCOUNTER — OFFICE VISIT (OUTPATIENT)
Dept: FAMILY MEDICINE CLINIC | Age: 49
End: 2019-08-09
Payer: COMMERCIAL

## 2019-08-09 VITALS
SYSTOLIC BLOOD PRESSURE: 118 MMHG | HEIGHT: 67 IN | DIASTOLIC BLOOD PRESSURE: 84 MMHG | BODY MASS INDEX: 27.15 KG/M2 | WEIGHT: 173 LBS

## 2019-08-09 DIAGNOSIS — F32.5 DEPRESSION, MAJOR, IN REMISSION (HCC): ICD-10-CM

## 2019-08-09 DIAGNOSIS — I10 ESSENTIAL HYPERTENSION: Primary | ICD-10-CM

## 2019-08-09 DIAGNOSIS — E78.49 OTHER HYPERLIPIDEMIA: ICD-10-CM

## 2019-08-09 DIAGNOSIS — R73.9 HYPERGLYCEMIA: ICD-10-CM

## 2019-08-09 DIAGNOSIS — G89.29 OTHER CHRONIC PAIN: ICD-10-CM

## 2019-08-09 DIAGNOSIS — G47.00 INSOMNIA, UNSPECIFIED TYPE: ICD-10-CM

## 2019-08-09 DIAGNOSIS — E55.9 VITAMIN D DEFICIENCY: ICD-10-CM

## 2019-08-09 DIAGNOSIS — M79.7 FIBROMYALGIA: ICD-10-CM

## 2019-08-09 PROCEDURE — 99214 OFFICE O/P EST MOD 30 MIN: CPT | Performed by: FAMILY MEDICINE

## 2019-08-09 PROCEDURE — G8417 CALC BMI ABV UP PARAM F/U: HCPCS | Performed by: FAMILY MEDICINE

## 2019-08-09 PROCEDURE — 1036F TOBACCO NON-USER: CPT | Performed by: FAMILY MEDICINE

## 2019-08-09 PROCEDURE — G8427 DOCREV CUR MEDS BY ELIG CLIN: HCPCS | Performed by: FAMILY MEDICINE

## 2019-08-09 RX ORDER — CYCLOBENZAPRINE HCL 10 MG
10 TABLET ORAL EVERY EVENING
Qty: 30 TABLET | Refills: 2 | Status: SHIPPED | OUTPATIENT
Start: 2019-08-09 | End: 2019-09-08

## 2019-08-09 NOTE — PROGRESS NOTES
DAILY 12/7/18  Yes Lisa Holt MD   sertraline (ZOLOFT) 50 MG tablet Take 1.5 tablets by mouth daily 11/27/18  Yes Lisa Holt MD   ALPRAZolam [facility]) 0.5 MG tablet Take 0.25-0.5 mg by mouth nightly as needed for Sleep. .   Yes Historical Provider, MD   simvastatin (ZOCOR) 40 MG tablet TAKE 1 TABLET BY MOUTH DAILY IN THE EVENING 10/1/18  Yes Lisa Holt MD   butalbital-aspirin-caffeine [facility]) -49 MG capsule Take 1 capsule by mouth every 4 hours as needed for Headaches 7/14/17  Yes Lisa Holt MD       ROS:  General Constitutional: Denies chills. Denies fever. Admits headache. Denies lightheadedness. Ophthalmologic: Denies blurred vision. Had a partial vitreous separation while back but did not require surgery  ENT: Denies nasal congestion. Denies sore throat. Denies ear pain and pressure. Respiratory: Denies cough. Denies shortness of breath. Denies wheezing. Cardiovascular: Denies chest pain at rest. Denies irregular heartbeat. Denies palpitations. Gastrointestinal: Admits IBS  Genitourinary: Denies blood in the urine. Denies difficulty urinating. Denies frequent urination. Denies painful urination. Denies urinary incontinence. Musculoskeletal: Admits pain in joints and muscles (fibromyalgia), the pain is worsening but she does not want to start using any narcotic pills, she is interested in medical marijuana \"I take way too much Aleve and IBU and I'm afraid it is going to kill my kidneys one of these days\"  Peripheral Vascular: Denies pain/cramping in legs after exertion. Skin: Denies dry skin. Denies itching. Denies rash. Neurologic: Denies falls. Denies dizziness. Denies fainting. Denies tingling/numbness. Psychiatric: Admits sleep disturbance related to pain. Denies anxiety. Denies depressed mood.     Past Surgical History:   Procedure Laterality Date    APPENDECTOMY  12    BACK SURGERY  1988    L1 or L2 shattered and repaired    COLONOSCOPY  2004/2005    COLPOSCOPY  03/30/2011

## 2019-09-07 DIAGNOSIS — F32.4 MAJOR DEPRESSIVE DISORDER IN PARTIAL REMISSION, UNSPECIFIED WHETHER RECURRENT (HCC): ICD-10-CM

## 2019-12-13 DIAGNOSIS — F33.9 EPISODE OF RECURRENT MAJOR DEPRESSIVE DISORDER, UNSPECIFIED DEPRESSION EPISODE SEVERITY (HCC): ICD-10-CM

## 2019-12-13 DIAGNOSIS — Z13.31 POSITIVE DEPRESSION SCREENING: ICD-10-CM

## 2019-12-13 DIAGNOSIS — F41.9 ANXIETY: ICD-10-CM

## 2019-12-13 RX ORDER — ALPRAZOLAM 0.25 MG/1
TABLET ORAL
Qty: 30 TABLET | Refills: 0 | Status: SHIPPED | OUTPATIENT
Start: 2019-12-13 | End: 2020-05-18 | Stop reason: SDUPTHER

## 2019-12-16 DIAGNOSIS — F32.4 MAJOR DEPRESSIVE DISORDER IN PARTIAL REMISSION, UNSPECIFIED WHETHER RECURRENT (HCC): ICD-10-CM

## 2020-01-03 RX ORDER — DULOXETIN HYDROCHLORIDE 30 MG/1
CAPSULE, DELAYED RELEASE ORAL
Qty: 90 CAPSULE | Refills: 3 | Status: SHIPPED | OUTPATIENT
Start: 2020-01-03 | End: 2020-12-10 | Stop reason: SDUPTHER

## 2020-01-03 RX ORDER — BUTALBITAL, ACETAMINOPHEN AND CAFFEINE 50; 325; 40 MG/1; MG/1; MG/1
TABLET ORAL
Qty: 30 TABLET | Refills: 0 | Status: SHIPPED | OUTPATIENT
Start: 2020-01-03 | End: 2020-05-11

## 2020-02-05 LAB
ABSOLUTE BASO #: 0.1 X10E9/L (ref 0–0.9)
ABSOLUTE EOS #: 0.2 X10E9/L (ref 0–0.4)
ABSOLUTE LYMPH #: 2.1 X10E9/L (ref 1–3.5)
ABSOLUTE MONO #: 0.7 X10E9/L (ref 0–0.9)
ABSOLUTE NEUT #: 4 X10E9/L (ref 1.5–6.6)
ALT SERPL-CCNC: 22 U/L (ref 0–31)
AST SERPL-CCNC: 23 U/L (ref 0–41)
BASOPHILS RELATIVE PERCENT: 1.3 %
CHOLESTEROL/HDL RATIO: 3.3 (ref 1–5)
CHOLESTEROL: 169 MG/DL (ref 150–200)
EOSINOPHILS RELATIVE PERCENT: 3.4 %
FOLATE: 18.9 NG/ML
HCT VFR BLD CALC: 43.2 % (ref 35–47)
HDLC SERPL-MCNC: 52 MG/DL
HEMOGLOBIN: 14.9 G/DL (ref 11.7–16)
HIGH SENSITIVE C-REACTIVE PROTEIN: 0.19 MG/DL (ref 0–0.74)
LDL CHOLESTEROL CALCULATED: 79 MG/DL
LDL/HDL RATIO: 1.5
LYMPHOCYTE %: 29.4 %
MCH RBC QN AUTO: 31.9 PG (ref 26–33.5)
MCHC RBC AUTO-ENTMCNC: 34.4 G/DL (ref 32–36)
MCV RBC AUTO: 93 FL (ref 81–100)
MONOCYTES # BLD: 9.9 %
NEUTROPHILS RELATIVE PERCENT: 56 %
PDW BLD-RTO: 12.8 % (ref 11.5–14.7)
PLATELETS: 281 X10E9/L (ref 150–450)
PMV BLD AUTO: 9.2 FL (ref 7–12)
RBC: 4.66 X10E12/L (ref 3.8–5.2)
TRIGL SERPL-MCNC: 190 MG/DL (ref 27–150)
VITAMIN B-12: 224 PG/ML (ref 180–914)
VITAMIN D 25-HYDROXY: 28.7 NG/ML (ref 30–100)
VLDLC SERPL CALC-MCNC: 38 MG/DL (ref 0–30)
WBC: 7.1 X10E9/L (ref 4.8–10.8)

## 2020-02-07 ENCOUNTER — OFFICE VISIT (OUTPATIENT)
Dept: FAMILY MEDICINE CLINIC | Age: 50
End: 2020-02-07
Payer: COMMERCIAL

## 2020-02-07 VITALS
SYSTOLIC BLOOD PRESSURE: 128 MMHG | BODY MASS INDEX: 29.19 KG/M2 | WEIGHT: 186 LBS | DIASTOLIC BLOOD PRESSURE: 82 MMHG | HEIGHT: 67 IN

## 2020-02-07 PROCEDURE — G8427 DOCREV CUR MEDS BY ELIG CLIN: HCPCS | Performed by: FAMILY MEDICINE

## 2020-02-07 PROCEDURE — G8484 FLU IMMUNIZE NO ADMIN: HCPCS | Performed by: FAMILY MEDICINE

## 2020-02-07 PROCEDURE — 99214 OFFICE O/P EST MOD 30 MIN: CPT | Performed by: FAMILY MEDICINE

## 2020-02-07 PROCEDURE — G8417 CALC BMI ABV UP PARAM F/U: HCPCS | Performed by: FAMILY MEDICINE

## 2020-02-07 PROCEDURE — 1036F TOBACCO NON-USER: CPT | Performed by: FAMILY MEDICINE

## 2020-02-07 RX ORDER — MELATONIN
2000 DAILY
Qty: 90 TABLET | Refills: 1 | COMMUNITY
Start: 2020-02-07

## 2020-02-07 RX ORDER — CHOLECALCIFEROL (VITAMIN D3) 125 MCG
500 CAPSULE ORAL DAILY
Qty: 30 TABLET | Refills: 3 | Status: SHIPPED | OUTPATIENT
Start: 2020-02-07 | End: 2020-05-27

## 2020-02-07 NOTE — PATIENT INSTRUCTIONS
PLAN:  I reviewed her laboratory results with her. Her b12 level is low, I would like for her to get back on her B12 vitamin. Her Vitamin D is borderline low as well. She just recently started taking a Vitamin D supplement, she should continue on this but increase it to 2000 units daily. We discussed her family history of dementia and the relationship that that has to atherosclerosis. Anti-inflammatory things such as keeping a plant based diet, maintain BP control, avoid diabetes, do aerobic activity, challenge multiple realms of cognition, and get adequate sleep can help prevent this. She has been trying yoga but needs a partner to keep her motivated and accountable. I will re-order her sleep study, she prefers that this be done at [facility]. She will get dilan of her clinic of preference and they will contact us for the order. She is due for another mammogram, I will order this. SURVEY:    You may be receiving a survey from [employer] regarding your visit today. Please complete the survey to enable us to provide the highest quality of care to you and your family. If you cannot score us a very good on any question, please call the office to discuss how we could have made your experience a very good one. Thank you.

## 2020-02-07 NOTE — PROGRESS NOTES
Dionne BRITO [facility], am scribing for and in the presence of Dr. Ramsey Jo. 2/7/20/4:13 pm/L    [address] [address]  [place] [address]  Dept: [phone]    Ric Hughes is a 52 y.o. female here for 6 Month Follow-Up; Hypertension; and Hyperlipidemia      HPI:  HYPERTENSION:  Medication compliance: compliant all of the time. Medication Therapy: losartan HCTZ  She is exercising, yoga and yard/house work  She is adherent to a low-sodium diet.    Blood pressure is not being monitored at home. Patient reports that [facility] limited alcohol intake. Does the patient have sleep apnea? Yes, did not tolerate the cpap - interested in a retrial       HYPERLIPIDEMIA:  Medication compliance: compliant all of the time. Medication Therapy: simvastatin (Zocor)  Patient is following a low fat, low cholesterol diet.    She is exercising regularly, yoga and yard/house work    Prior to Admission medications    Medication Sig Start Date End Date Taking?  Authorizing Provider   vitamin B-12 (CYANOCOBALAMIN) 500 MCG tablet Take 1 tablet by mouth daily 2/7/20 2/6/21 Yes Ramsey Jo MD   Cholecalciferol (VITAMIN D3) 25 MCG (1000 UT) TABS Take 2 tablets by mouth daily 2/7/20  Yes Ramsey Jo MD   butalbital-acetaminophen-caffeine (FIORICET, ESGIC) -40 MG per tablet TAKE 1 TABLET BY MOUTH DAILY AS NEEDED FOR PAIN 1/3/20  Yes Ramsey Jo MD   DULoxetine (CYMBALTA) 30 MG extended release capsule TAKE 1 CAPSULE BY MOUTH EVERY DAY 1/3/20  Yes Ramsey Jo MD   simvastatin (ZOCOR) 40 MG tablet TAKE 1 TABLET BY MOUTH DAILY IN THE EVENING 10/4/19  Yes Ramsey Jo MD   levothyroxine (SYNTHROID) 100 MCG tablet TAKE 1 TABLET BY MOUTH EVERY DAY 8/5/19  Yes Ramsey Jo MD   losartan (COZAAR) 50 MG tablet Take 1 tablet by mouth daily 8/1/19  Yes Ramsey Jo MD   hydrochlorothiazide (HYDRODIURIL) 12.5 MG tablet Take 1 tablet by mouth daily 8/1/19  Yes Ramsey Jo MD breast       Past Medical History:   Diagnosis Date    Depression     Headache     Hyperlipidemia     Hypertension     Hypothyroidism     Insomnia 2014    Irritable bowel syndrome       Social History     Tobacco Use    Smoking status: Never Smoker    Smokeless tobacco: Never Used   Substance Use Topics    Alcohol use: No     Alcohol/week: 0.0 standard drinks      Current Outpatient Medications   Medication Sig Dispense Refill    vitamin B-12 (CYANOCOBALAMIN) 500 MCG tablet Take 1 tablet by mouth daily 30 tablet 3    Cholecalciferol (VITAMIN D3) 25 MCG (1000 UT) TABS Take 2 tablets by mouth daily 90 tablet 1    butalbital-acetaminophen-caffeine (FIORICET, ESGIC) -40 MG per tablet TAKE 1 TABLET BY MOUTH DAILY AS NEEDED FOR PAIN 30 tablet 0    DULoxetine (CYMBALTA) 30 MG extended release capsule TAKE 1 CAPSULE BY MOUTH EVERY DAY 90 capsule 3    simvastatin (ZOCOR) 40 MG tablet TAKE 1 TABLET BY MOUTH DAILY IN THE EVENING 90 tablet 3    levothyroxine (SYNTHROID) 100 MCG tablet TAKE 1 TABLET BY MOUTH EVERY DAY 90 tablet 3    losartan (COZAAR) 50 MG tablet Take 1 tablet by mouth daily 90 tablet 3    hydrochlorothiazide (HYDRODIURIL) 12.5 MG tablet Take 1 tablet by mouth daily 90 tablet 3    ibuprofen (ADVIL;MOTRIN) 800 MG tablet TAKE 1 TABLET EVERY 8 HOURS AS NEEDED 90 tablet 0    Cyanocobalamin (VITAMIN B 12 PO) Take by mouth      sertraline (ZOLOFT) 50 MG tablet Take 1.5 tablets by mouth daily 1 tablet 0    butalbital-aspirin-caffeine (FIORINAL) -40 MG capsule Take 1 capsule by mouth every 4 hours as needed for Headaches 30 capsule 3     No current facility-administered medications for this visit.       No Known Allergies    PHYSICAL EXAM:    /82   Ht 5' 7\" (1.702 m)   Wt 186 lb (84.4 kg)   BMI 29.13 kg/m²   Wt Readings from Last 3 Encounters:   02/07/20 186 lb (84.4 kg)   08/09/19 173 lb (78.5 kg)   01/04/19 182 lb (82.6 kg)     BP Readings from Last 3 Encounters: adequate sleep can help prevent this. She has been trying yoga but needs a partner to keep her motivated and accountable. I will re-order her sleep study, she prefers that this be done at [facility]. She will get dilan of her clinic of preference and they will contact us for the order. She is due for another mammogram, I will order this. Orders Placed This Encounter   Procedures    ALEKSEY DIGITAL SCREEN W CAD BILATERAL     Standing Status:   Future     Standing Expiration Date:   4/9/2021    ALT     Standing Status:   Future     Standing Expiration Date:   2/6/2021    AST     Standing Status:   Future     Standing Expiration Date:   2/6/2021    CBC     Standing Status:   Future     Standing Expiration Date:   2/6/2021    Vitamin B12     Standing Status:   Future     Standing Expiration Date:   2/6/2021    Hemoglobin A1C     Standing Status:   Future     Standing Expiration Date:   2/6/2021    Vitamin D 25 Hydroxy     Standing Status:   Future     Standing Expiration Date:   2/6/2021     Orders Placed This Encounter   Medications    vitamin B-12 (CYANOCOBALAMIN) 500 MCG tablet     Sig: Take 1 tablet by mouth daily     Dispense:  30 tablet     Refill:  3    Cholecalciferol (VITAMIN D3) 25 MCG (1000 UT) TABS     Sig: Take 2 tablets by mouth daily     Dispense:  90 tablet     Refill:  1     I, Dr. Shira Gonzalez, personally performed the services described in this documentation as scribed by HELGA Bernabe in my presence, and is both accurate and complete.

## 2020-03-10 ENCOUNTER — HOSPITAL ENCOUNTER (OUTPATIENT)
Dept: WOMENS IMAGING | Age: 50
Discharge: HOME | End: 2020-03-12
Payer: COMMERCIAL

## 2020-03-10 PROCEDURE — 77063 BREAST TOMOSYNTHESIS BI: CPT

## 2020-05-11 RX ORDER — BUTALBITAL, ACETAMINOPHEN AND CAFFEINE 50; 325; 40 MG/1; MG/1; MG/1
TABLET ORAL
Qty: 30 TABLET | Refills: 0 | Status: SHIPPED | OUTPATIENT
Start: 2020-05-11 | End: 2021-04-19 | Stop reason: SDUPTHER

## 2020-05-18 RX ORDER — ALPRAZOLAM 0.25 MG/1
TABLET ORAL
Qty: 30 TABLET | Refills: 0 | Status: SHIPPED | OUTPATIENT
Start: 2020-05-18 | End: 2020-06-23 | Stop reason: SDUPTHER

## 2020-05-27 RX ORDER — CALCIUM CARB/VIT D3/MINERALS 600 MG-200
TABLET,CHEWABLE ORAL
Qty: 90 TABLET | Refills: 1 | Status: SHIPPED | OUTPATIENT
Start: 2020-05-27 | End: 2020-11-23

## 2020-06-23 RX ORDER — ALPRAZOLAM 0.25 MG/1
TABLET ORAL
Qty: 30 TABLET | Refills: 0 | Status: SHIPPED | OUTPATIENT
Start: 2020-06-23 | End: 2020-07-24

## 2020-06-23 NOTE — TELEPHONE ENCOUNTER
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 She is also in [place]. She is setting up a [employer] account for her appts.

## 2020-06-29 RX ORDER — LEVOTHYROXINE SODIUM 0.1 MG/1
TABLET ORAL
Qty: 90 TABLET | Refills: 3 | Status: SHIPPED | OUTPATIENT
Start: 2020-06-29 | End: 2021-05-25

## 2020-06-29 RX ORDER — HYDROCHLOROTHIAZIDE 12.5 MG/1
TABLET ORAL
Qty: 90 TABLET | Refills: 3 | Status: SHIPPED | OUTPATIENT
Start: 2020-06-29 | End: 2021-05-25

## 2020-07-23 RX ORDER — LOSARTAN POTASSIUM 50 MG/1
TABLET ORAL
Qty: 90 TABLET | Refills: 3 | Status: SHIPPED | OUTPATIENT
Start: 2020-07-23 | End: 2021-07-19

## 2020-08-14 LAB
ABSOLUTE BASO #: 0.1 X10E9/L (ref 0–0.2)
ABSOLUTE EOS #: 0.2 X10E9/L (ref 0–0.4)
ABSOLUTE LYMPH #: 2.3 X10E9/L (ref 1–3.5)
ABSOLUTE MONO #: 0.6 X10E9/L (ref 0–0.9)
ABSOLUTE NEUT #: 4.6 X10E9/L (ref 1.5–6.6)
ALT SERPL-CCNC: 34 U/L (ref 0–31)
AST SERPL-CCNC: 27 U/L (ref 0–41)
AVERAGE GLUCOSE: 103 MG/DL
BASOPHILS RELATIVE PERCENT: 0.9 %
EOSINOPHILS RELATIVE PERCENT: 3 %
HBA1C MFR BLD: 5.2 % (ref 4.4–6.4)
HCT VFR BLD CALC: 49.4 % (ref 35–47)
HEMOGLOBIN: 17.2 G/DL (ref 11.7–15.5)
LYMPHOCYTE %: 29 %
MCH RBC QN AUTO: 31.9 PG (ref 27–34)
MCHC RBC AUTO-ENTMCNC: 34.8 G/DL (ref 32–36)
MCV RBC AUTO: 92 FL (ref 80–100)
MONOCYTES # BLD: 7.8 %
NEUTROPHILS RELATIVE PERCENT: 59.3 %
PDW BLD-RTO: 12.6 % (ref 11.5–15)
PLATELETS: 269 X10E9/L (ref 150–450)
PMV BLD AUTO: 9.7 FL (ref 7–12)
RBC: 5.38 X10E12/L (ref 3.8–5.2)
VITAMIN B-12: 1497 PG/ML (ref 180–914)
VITAMIN D 25-HYDROXY: 42.9 NG/ML (ref 30–100)
WBC: 7.8 X10E9/L (ref 4–11)

## 2020-08-18 ENCOUNTER — TELEPHONE (OUTPATIENT)
Dept: GASTROENTEROLOGY | Facility: CLINIC | Age: 50
End: 2020-08-18

## 2020-08-18 ENCOUNTER — OFFICE VISIT (OUTPATIENT)
Dept: FAMILY MEDICINE CLINIC | Age: 50
End: 2020-08-18
Payer: COMMERCIAL

## 2020-08-18 ENCOUNTER — TELEPHONE (OUTPATIENT)
Dept: FAMILY MEDICINE CLINIC | Age: 50
End: 2020-08-18

## 2020-08-18 VITALS
WEIGHT: 173 LBS | BODY MASS INDEX: 27.15 KG/M2 | DIASTOLIC BLOOD PRESSURE: 60 MMHG | SYSTOLIC BLOOD PRESSURE: 98 MMHG | HEIGHT: 67 IN

## 2020-08-18 PROCEDURE — 99396 PREV VISIT EST AGE 40-64: CPT | Performed by: NURSE PRACTITIONER

## 2020-08-18 RX ORDER — DULOXETIN HYDROCHLORIDE 60 MG/1
60 CAPSULE, DELAYED RELEASE ORAL DAILY
Qty: 30 CAPSULE | Refills: 1 | Status: CANCELLED | OUTPATIENT
Start: 2020-08-18 | End: 2020-10-17

## 2020-08-18 ASSESSMENT — ENCOUNTER SYMPTOMS
ABDOMINAL PAIN: 0
WHEEZING: 0
CONSTIPATION: 0
SHORTNESS OF BREATH: 0
RHINORRHEA: 0
COUGH: 1
CHEST TIGHTNESS: 0
DIARRHEA: 0
SORE THROAT: 0

## 2020-08-18 ASSESSMENT — PATIENT HEALTH QUESTIONNAIRE - PHQ9
SUM OF ALL RESPONSES TO PHQ9 QUESTIONS 1 & 2: 0
SUM OF ALL RESPONSES TO PHQ QUESTIONS 1-9: 0
SUM OF ALL RESPONSES TO PHQ QUESTIONS 1-9: 0
2. FEELING DOWN, DEPRESSED OR HOPELESS: 0
1. LITTLE INTEREST OR PLEASURE IN DOING THINGS: 0

## 2020-08-18 NOTE — PROGRESS NOTES
Name: General Greer  : 1970         Chief Complaint:     Chief Complaint   Patient presents with    6 Month Follow-Up    Hypertension    Hyperlipidemia    Colon Cancer Screening     would like to discuss colonoscopy in January    Other     daytime somnolence, sleep a ton but never feel rested, was prescribed a CPAP years ago but didn't benefit from it. would like to have repeat study and give this another try in January. History of Present Illness:      General Greer is a 48 y.o.  female who presents with 6 Month Follow-Up; Hypertension; Hyperlipidemia; Colon Cancer Screening (would like to discuss colonoscopy in January); and Other (daytime somnolence, sleep a ton but never feel rested, was prescribed a CPAP years ago but didn't benefit from it. would like to have repeat study and give this another try in January. )      HPI    Hypertension:   The patient is currently taking losartan 50mg and HCTZ 12.5mg daily and denies concerns with these medications. She is not monitoring her blood pressure at home. She denies SOB related to blood pressure (admits SOB during heightened levels of anxiety). She denies chest pain, dizziness, lightheadedness, or syncope. She admits intermittent headaches. She is monitoring her sodium intake. Hyperlipidemia:   The patient denies current exercise. She states that prior to her father's death, she went for daily walks. She is monitoring her diet and reports paying attention to carbohydrates and fats. She is monitoring her fast food intake, as well. 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 Anxiety/Depression:   The patient reports increased levels of anxiety. She reports that her father recently passed away, her mother has a diagnosis of Lewy Body Dementia, and she works at [employer] which has been stressful during [facility] [employer]. She states that her depression is \"under control\" but her anxiety is not.  She takes zoloft 50mg daily, cymbalta 30mg daily, and 1/2 tab xanax 0.25mg twice daily. She has been taking xanax every day since the beginning of the month due to heightened anxiety levels. She denies thoughts of hurting self or others. She reports that she has counseling appointment scheduled but is unable to get scheduled until next month. Past Medical History:     Past Medical History:   Diagnosis Date    Depression     Headache     Hyperlipidemia     Hypertension     Hypothyroidism     Insomnia 2014    Irritable bowel syndrome       Reviewed all health maintenance requirements and ordered appropriate tests  Health Maintenance Due   Topic Date Due    HIV screen  05/09/1985    DTaP/Tdap/Td vaccine (1 - Tdap) 05/09/1989    Cervical cancer screen  01/06/2018    Shingles Vaccine (1 of 2) 05/09/2020    Colon cancer screen colonoscopy  05/09/2020    TSH testing  08/05/2020    Potassium monitoring  08/05/2020    Creatinine monitoring  08/05/2020       Past Surgical History:     Past Surgical History:   Procedure Laterality Date    APPENDECTOMY  1991    BACK SURGERY  1988    L1 or L2 shattered and repaired    COLONOSCOPY  2004/2005    COLPOSCOPY  03/30/2011    C1N1        Medications:       Prior to Admission medications    Medication Sig Start Date End Date Taking?  Authorizing Provider   losartan (COZAAR) 50 MG tablet TAKE 1 TABLET BY MOUTH EVERY DAY 7/23/20  Yes Katie Zamora MD   sertraline (ZOLOFT) 50 MG tablet TAKE 2 TABLETS BY MOUTH EVERY DAY 7/7/20  Yes Katie Zamora MD   levothyroxine (SYNTHROID) 100 MCG tablet TAKE 1 TABLET BY MOUTH EVERY DAY 6/29/20  Yes Katie Zamora MD   hydrochlorothiazide (HYDRODIURIL) 12.5 MG tablet TAKE 1 TABLET BY MOUTH EVERY DAY 6/29/20  Yes Katie Zamora MD   [facility] B-12 500 MCG tablet TAKE 1 TABLET BY MOUTH EVERY DAY 5/27/20  Yes Katie Zamora MD   butalbital-acetaminophen-caffeine (FIORICET, ESGIC) -40 MG per tablet TAKE 1 TABLET BY MOUTH DAILY AS NEEDED FOR PAIN mattress. ). Negative for arthralgias and myalgias. Neurological: Positive for headaches. Negative for dizziness and light-headedness. Psychiatric/Behavioral: Positive for decreased concentration and sleep disturbance. Negative for agitation and confusion. The patient is nervous/anxious. sleep but does not feel well rested - will get ready fro sleep studt at [facility]. Physical Exam:   Vitals:  BP 98/60   Ht 5' 7\" (1.702 m)   Wt 173 lb (78.5 kg)   BMI 27.10 kg/m²     Physical Exam  Constitutional:       Appearance: Normal appearance. HENT:      Head: Normocephalic. Right Ear: Tympanic membrane, ear canal and external ear normal. There is no impacted cerumen. Left Ear: Tympanic membrane, ear canal and external ear normal. There is no impacted cerumen. Nose: Nose normal. No congestion or rhinorrhea. Mouth/Throat:      Mouth: Mucous membranes are moist.      Pharynx: No oropharyngeal exudate or posterior oropharyngeal erythema. Eyes:      General:         Right eye: No discharge. Left eye: No discharge. Extraocular Movements: Extraocular movements intact. Conjunctiva/sclera: Conjunctivae normal.      Pupils: Pupils are equal, round, and reactive to light. Neck:      Musculoskeletal: Neck supple. Cardiovascular:      Rate and Rhythm: Normal rate and regular rhythm. Heart sounds: Normal heart sounds. No murmur. Comments: HR 92  Pulmonary:      Effort: Pulmonary effort is normal. No respiratory distress. Breath sounds: Normal breath sounds. No stridor. No wheezing, rhonchi or rales. Abdominal:      General: Abdomen is flat. Bowel sounds are normal. There is no distension. Palpations: Abdomen is soft. There is no mass. Tenderness: There is no abdominal tenderness. There is no guarding. Hernia: No hernia is present. Comments: No RUQ tenderness to palpation   Musculoskeletal:      Right lower leg: No edema.       Left lower supplement every other day, rather than every day. Her hg/hct was elevated - I instructed her to remain well hydrated. Her ALT was elevated, she denies routine alcohol use. I recommended dietary modification to include low fat diet. I will reassess her labs at her next visit. - She reports a colonoscopy that was performed in [id number] d/t her sister's IBD diagnosis. I discussed with her that she is due for a repeat colonoscopy. Referral to Dr. Taisha aDmian placed today. - She reports a normal pap within the last 5 years. She reports a tetanus vaccination 3 years ago. She will send the office these records. - Instructed the patient that she is due for her shingles vaccination. She can complete this at the [facility] department. - WNL mammogram completed 3/2020. Hypertension:   - BP 98/60 today. She denies hypertension/hypotension symptoms. I encouraged her to monitor her blood pressure at home once a week. I will reassess these numbers at her next appointment and will consider lowering her blood pressure medications if necessary. Anxiety:   - The patient's anxiety symptoms are uncontrolled. She admits multiple current life stressors. I am hesitant to increase her dose of zoloft or cymbalta due to risk of serotonin syndrome, particularly with her low blood pressure today. Instead, I will add remeron to her regimen to assist with heightened anxiety/panic attacks, as well as sleep. I discussed the medication change with the patient via phone following her visit. I instructed her to take Remeron routinely at night. I cautioned this medication may make her more tired, and to avoid taking it in conjunction with xanax as they may cause increased sedation when taken together. The patient will follow up in 1 month or before if she experiences side effects with the medication.    - I have reviewed the patient's PDMP. I do not suspect medication abuse. I will prescribe alprazolam 0.25mg #30 prn.          Completed Refills Requested Prescriptions     Pending Prescriptions Disp Refills    ALPRAZolam (XANAX) 0.25 MG tablet 30 tablet 0     Sig: Take 1/2 tablet by mouth three times a day as needed for anxiety    mirtazapine (REMERON) 15 MG tablet 30 tablet 1     Sig: Take 1 tablet by mouth nightly       Orders Placed This Encounter   Procedures    Comprehensive Metabolic Panel     Standing Status:   Future     Standing Expiration Date:   8/18/2021    CBC With Auto Differential     Standing Status:   Future     Standing Expiration Date:   8/18/2021    TSH     Standing Status:   Future     Standing Expiration Date:   8/18/2021    Vitamin B12     Standing Status:   Future     Standing Expiration Date:   8/18/2021    Lipid Panel     Standing Status:   Future     Standing Expiration Date:   8/18/2021     Order Specific Question:   Is Patient Fasting?/# of Hours     Answer:   fasting    Vitamin D 25 Hydroxy     Standing Status:   Future     Standing Expiration Date:   8/18/2021    Hemoglobin A1C     Standing Status:   Future     Standing Expiration Date:   8/18/2021        No results found for this visit on 08/18/20. Return in about 4 weeks (around 9/15/2020), or if symptoms worsen or fail to improve.     Electronically signed by KAREN Younger CNP on 08/18/20 at 5:42 PM.

## 2020-08-18 NOTE — TELEPHONE ENCOUNTER
Direct Endoscopy Referral       Fax to [phone] or call the Gregg Gerard at [phone]    Brandon Cassidy  1970  [phone] There is no work phone number on file. [address]  [facility]    Referring Provider: John Espinoza MD   [address] [address] [phone]  Special Requests: PLEASE CALL AND SCHEDULE PATIENT    Check Requested Procedures:    [x]  Colonoscopy (Please check test type and diagnosis below)    [x]  CPT Code:  Screening Average Risk     []  CPT Code:  Screening High Risk                 []  CPT Code :  Diagnostic Colonoscopy 48904    []  Personal History of colon cancer (Date of surgery)               []  Personal History of colon polyps (Date of surgery)     []  Family history of colon cancer ([facility])     []  Family history of colon polyps (1st degree relative - [facility])     []  Abnormal barium enema or CT (Please attach report)    []  Change in bowel habits     []  Occult GI bleeding     []  Melena with negative EGD     []  Iron deficiency anemia    []  Other:        []  EGD (Please check test type and diagnosis below)     []  CPT Code: EGD 84716    []  Melena     []  Dyspepsia/GERD     []  Dysphagia     []  Epigastric pain unresponsive to treatment     []  Iron deficiency anemia with negative colonoscopy     []  Occult GI bleeding with negative colonoscopy     []  Abnormal UGI, x-ray, or CT (attach report)    [] Other:    HISTORY:  Past Medical History:   Diagnosis Date    Depression     Headache     Hyperlipidemia     Hypertension     Hypothyroidism     Insomnia 2014    Irritable bowel syndrome      Patient has no known allergies. Prior to Visit Medications    Medication Sig Taking?  Authorizing Provider   losartan (COZAAR) 50 MG tablet TAKE 1 TABLET BY MOUTH EVERY DAY  John Espinoza MD   sertraline (ZOLOFT) 50 MG tablet TAKE 2 TABLETS BY MOUTH EVERY DAY  John Espinoza MD   levothyroxine (SYNTHROID) 100 MCG tablet TAKE 1

## 2020-08-19 RX ORDER — ALPRAZOLAM 0.25 MG/1
TABLET ORAL
Qty: 30 TABLET | Refills: 0 | Status: SHIPPED | OUTPATIENT
Start: 2020-08-19 | End: 2021-04-19 | Stop reason: SDUPTHER

## 2020-08-19 RX ORDER — MIRTAZAPINE 15 MG/1
15 TABLET, FILM COATED ORAL NIGHTLY
Qty: 30 TABLET | Refills: 1 | Status: SHIPPED | OUTPATIENT
Start: 2020-08-19 | End: 2020-09-14

## 2020-08-19 ASSESSMENT — ENCOUNTER SYMPTOMS: BACK PAIN: 1

## 2020-08-26 PROBLEM — Z12.11 COLON CANCER SCREENING: Status: ACTIVE | Noted: 2020-08-26

## 2020-08-26 RX ORDER — SODIUM, POTASSIUM,MAG SULFATES 17.5-3.13G
SOLUTION, RECONSTITUTED, ORAL ORAL
Qty: 2 BOTTLE | Refills: 0 | Status: SHIPPED | OUTPATIENT
Start: 2020-08-26

## 2020-09-14 RX ORDER — MIRTAZAPINE 15 MG/1
TABLET, FILM COATED ORAL
Qty: 30 TABLET | Refills: 1 | Status: SHIPPED | OUTPATIENT
Start: 2020-09-14 | End: 2020-10-09

## 2020-09-18 ENCOUNTER — OFFICE VISIT (OUTPATIENT)
Dept: FAMILY MEDICINE CLINIC | Age: 50
End: 2020-09-18
Payer: COMMERCIAL

## 2020-09-18 VITALS
SYSTOLIC BLOOD PRESSURE: 124 MMHG | HEIGHT: 67 IN | WEIGHT: 179 LBS | BODY MASS INDEX: 28.09 KG/M2 | DIASTOLIC BLOOD PRESSURE: 80 MMHG

## 2020-09-18 PROCEDURE — 99213 OFFICE O/P EST LOW 20 MIN: CPT | Performed by: NURSE PRACTITIONER

## 2020-09-18 ASSESSMENT — ENCOUNTER SYMPTOMS
SHORTNESS OF BREATH: 0
CONSTIPATION: 1
COUGH: 1
ABDOMINAL PAIN: 0
CHEST TIGHTNESS: 0

## 2020-09-18 NOTE — PROGRESS NOTES
others. Past Medical History:     Past Medical History:   Diagnosis Date    Depression     Headache     Hyperlipidemia     Hypertension     Hypothyroidism     Insomnia 2014    Irritable bowel syndrome       Reviewed all health maintenance requirements and ordered appropriate tests  Health Maintenance Due   Topic Date Due    HIV screen  05/09/1985    DTaP/Tdap/Td vaccine (1 - Tdap) 05/09/1989    Cervical cancer screen  01/06/2018    Shingles Vaccine (1 of 2) 05/09/2020    Colon cancer screen colonoscopy  05/09/2020    TSH testing  08/05/2020    Potassium monitoring  08/05/2020    Creatinine monitoring  08/05/2020    Flu vaccine (1) 09/01/2020       Past Surgical History:     Past Surgical History:   Procedure Laterality Date    APPENDECTOMY  1991    BACK SURGERY  1988    L1 or L2 shattered and repaired    COLONOSCOPY  2004/2005    COLPOSCOPY  03/30/2011    C1N1        Medications:       Prior to Admission medications    Medication Sig Start Date End Date Taking?  Authorizing Provider   mirtazapine (REMERON) 15 MG tablet TAKE 1 TABLET BY MOUTH EVERY DAY AT NIGHT 9/14/20  Yes Kristina Dodge APRN - CNP   losartan (COZAAR) 50 MG tablet TAKE 1 TABLET BY MOUTH EVERY DAY 7/23/20  Yes Demetrio Farmer MD   sertraline (ZOLOFT) 50 MG tablet TAKE 2 TABLETS BY MOUTH EVERY DAY 7/7/20  Yes Demetrio Farmer MD   levothyroxine (SYNTHROID) 100 MCG tablet TAKE 1 TABLET BY MOUTH EVERY DAY 6/29/20  Yes Demetrio Farmer MD   hydrochlorothiazide (HYDRODIURIL) 12.5 MG tablet TAKE 1 TABLET BY MOUTH EVERY DAY 6/29/20  Yes Demetrio Farmer MD   [facility] B-12 500 MCG tablet TAKE 1 TABLET BY MOUTH EVERY DAY 5/27/20  Yes Demetrio Farmer MD   butalbital-acetaminophen-caffeine (FIORICET, ESGIC) -79 MG per tablet TAKE 1 TABLET BY MOUTH DAILY AS NEEDED FOR PAIN 5/11/20  Yes Demetrio Farmer MD   Cholecalciferol (VITAMIN D3) 25 MCG (1000 UT) TABS Take 2 tablets by mouth daily 2/7/20  Yes Demetrio Farmer MD   DULoxetine (CYMBALTA) 30 MG extended release capsule TAKE 1 CAPSULE BY MOUTH EVERY DAY 1/3/20  Yes Dwight Lopez MD   simvastatin (ZOCOR) 40 MG tablet TAKE 1 TABLET BY MOUTH DAILY IN THE EVENING 10/4/19  Yes Dwight Lopez MD   ibuprofen (ADVIL;MOTRIN) 800 MG tablet TAKE 1 TABLET EVERY 8 HOURS AS NEEDED 2/4/19  Yes Dwight Lopez MD   Cyanocobalamin (VITAMIN B 12 PO) Take by mouth   Yes Historical Provider, MD   Na Sulfate-K Sulfate-Mg Sulf (SUPREP BOWEL PREP KIT) 17.5-3.13-1.6 GM/177ML SOLN Take as directed  Patient not taking: Reported on 9/18/2020 8/26/20   Trav Watson MD        Allergies:       Patient has no known allergies. Social History:     Tobacco:    reports that she has never smoked. She has never used smokeless tobacco.  Alcohol:      reports no history of alcohol use. Drug Use:  has no history on file for drug. Family History:     Family History   Problem Relation Age of Onset    Cancer Maternal Aunt         breast       Review of Systems:     Positive and Negative as described in HPI    Review of Systems   Constitutional: Positive for appetite change (Admits icnreased appetite this week) and fatigue. Respiratory: Positive for cough (Admits dry, non-productive cough since coming back from [place]. She denies interest in medication. ). Negative for chest tightness and shortness of breath. Cardiovascular: Negative for chest pain and palpitations. Gastrointestinal: Positive for constipation (Admits chronic history of constipation that has worsened since ). Negative for abdominal pain. Neurological: Negative for headaches. Psychiatric/Behavioral: Negative for agitation. The patient does not have insomnia. Physical Exam:   Vitals:  /80   Ht 5' 7\" (1.702 m)   Wt 179 lb (81.2 kg)   BMI 28.04 kg/m²     Physical Exam  Constitutional:       Appearance: Normal appearance. HENT:      Head: Normocephalic. Cardiovascular:      Rate and Rhythm: Regular rhythm. Tachycardia present.       Pulses: Normal pulses. Heart sounds: Normal heart sounds. No murmur. Comments:   Pulmonary:      Effort: Pulmonary effort is normal. No respiratory distress. Breath sounds: Normal breath sounds. No stridor. No wheezing, rhonchi or rales. Abdominal:      General: Abdomen is flat. Bowel sounds are normal. There is no distension. Palpations: Abdomen is soft. There is no mass. Tenderness: There is no abdominal tenderness. There is no guarding. Hernia: No hernia is present. Neurological:      Mental Status: She is alert and oriented to person, place, and time. Psychiatric:         Mood and Affect: Mood normal.         Behavior: Behavior normal.         Thought Content: Thought content normal.         Judgment: Judgment normal.         Data:     Lab Results   Component Value Date     08/05/2019    K 4.3 08/05/2019     08/05/2019    CO2 24 08/05/2019    BUN 18 08/05/2019    CREATININE 0.81 08/05/2019    GLUCOSE 95 08/05/2019    AST 27 08/13/2020    ALT 34 08/13/2020     Lab Results   Component Value Date    WBC 7.8 08/13/2020    RBC 5.38 08/13/2020    HGB 17.2 08/13/2020    HCT 49.4 08/13/2020    MCV 92 08/13/2020    MCH 31.9 08/13/2020    MCHC 34.8 08/13/2020    RDW 12.6 08/13/2020     08/13/2020    MPV 9.7 08/13/2020     Lab Results   Component Value Date    TSH 1.74 08/05/2019     Lab Results   Component Value Date    CHOL 169 02/04/2020    CHOL 188 07/01/2015    HDL 52 02/04/2020    LABA1C 5.2 08/13/2020       Assessment/Plan:      Diagnosis Orders   1. Anxiety     2. Episode of recurrent major depressive disorder, unspecified depression episode severity ([facility] [address]         Anxiety:   - Patient to continue current regimen. Continue mirtazapine 15mg qd as it has proven great benefit for her anxiety. Patient states that her bizarre dreams are manageable. We discussed medication change if side effects become too bothersome, she denies at this time.  Patient will alert the office if symptoms of anxiety or medication side effects persist or worsen. - Patient was tachycardic in office. We discussed ways to monitor this at home, including using her blood pressure cuff. If tachycardia continues, I will consider adding a betablocker. Constipation:   - Recommended miralax, patient reports this increases her anxiety. She would like to continue fiber capsules and stool softeners. I recommended adequate water intake and consumption of dietary fiber. She is to alert the office if this worsens or persists. Completed Refills   Requested Prescriptions      No prescriptions requested or ordered in this encounter       No orders of the defined types were placed in this encounter. No results found for this visit on 09/18/20. Return in about 3 months (around 12/18/2020), or if symptoms worsen or fail to improve, for anxiety follow up and medcheck.  .    Electronically signed by KAREN Arauz CNP on 09/19/20 at 6:45 AM.

## 2020-09-22 NOTE — TELEPHONE ENCOUNTER
Patient notified procedure is cancelled due to  retiring. Patient preferred to have information sent to general surgery office. Informed patient she will be receiving a call from that office to get scheduled.

## 2020-09-25 PROBLEM — Z12.11 COLON CANCER SCREENING: Status: RESOLVED | Noted: 2020-08-26 | Resolved: 2020-09-25

## 2020-10-09 RX ORDER — MIRTAZAPINE 15 MG/1
TABLET, FILM COATED ORAL
Qty: 90 TABLET | Refills: 3 | Status: SHIPPED | OUTPATIENT
Start: 2020-10-09 | End: 2020-11-09 | Stop reason: ALTCHOICE

## 2020-11-06 ENCOUNTER — TELEPHONE (OUTPATIENT)
Dept: FAMILY MEDICINE CLINIC | Age: 50
End: 2020-11-06

## 2020-11-06 NOTE — TELEPHONE ENCOUNTER
Patient is calling in regards to the new medication mirtazapine. She would like you to call her back.

## 2020-11-06 NOTE — TELEPHONE ENCOUNTER
The last time you guys talked the medication was working really well. Now its not. At what point do we up the medication? Her anxiety is getting worse and anxiety attacks.

## 2020-11-09 RX ORDER — MIRTAZAPINE 30 MG/1
30 TABLET, FILM COATED ORAL NIGHTLY
Qty: 30 TABLET | Refills: 2 | Status: SHIPPED | OUTPATIENT
Start: 2020-11-09 | End: 2020-12-02

## 2020-11-23 RX ORDER — CHOLECALCIFEROL (VITAMIN D3) 125 MCG
CAPSULE ORAL
Qty: 90 TABLET | Refills: 1 | Status: SHIPPED | OUTPATIENT
Start: 2020-11-23 | End: 2021-10-25

## 2020-12-02 RX ORDER — MIRTAZAPINE 30 MG/1
TABLET, FILM COATED ORAL
Qty: 30 TABLET | Refills: 2 | Status: SHIPPED | OUTPATIENT
Start: 2020-12-02 | End: 2021-03-02

## 2020-12-10 RX ORDER — DULOXETIN HYDROCHLORIDE 30 MG/1
CAPSULE, DELAYED RELEASE ORAL
Qty: 90 CAPSULE | Refills: 3 | Status: SHIPPED | OUTPATIENT
Start: 2020-12-10 | End: 2021-11-29

## 2020-12-17 RX ORDER — SIMVASTATIN 40 MG
TABLET ORAL
Qty: 90 TABLET | Refills: 3 | Status: SHIPPED | OUTPATIENT
Start: 2020-12-17 | End: 2021-12-09

## 2020-12-18 ENCOUNTER — TELEMEDICINE (OUTPATIENT)
Dept: FAMILY MEDICINE CLINIC | Age: 50
End: 2020-12-18
Payer: COMMERCIAL

## 2020-12-18 PROCEDURE — 99422 OL DIG E/M SVC 11-20 MIN: CPT | Performed by: NURSE PRACTITIONER

## 2020-12-18 NOTE — PROGRESS NOTES
Harriet Angeles is a 48 y.o. female being evaluated by a Virtual Visit (video visit) encounter to address concerns as mentioned above. A caregiver was present when appropriate. Due to this being a TeleHealth encounter (During RERVG-87 public health emergency), evaluation of the following organ systems was limited: Vitals/Constitutional/EENT/Resp/CV/GI//MS/Neuro/Skin/Heme-Lymph-Imm. Pursuant to the emergency declaration under the [address] [place] [address] and the [employer] Act, this Virtual Visit was conducted with patient's (and/or legal guardian's) consent, to reduce the patient's risk of exposure to COVID-19 and provide necessary medical care. The patient (and/or legal guardian) has also been advised to contact this office for worsening conditions or problems, and seek emergency medical treatment and/or call 911 if deemed necessary. Patient identification was verified at the start of the visit: Yes    Total time spent for this encounter: 20 minutes     HPI:     Hypertension:   Current medication regimen includes losartan 50 mg and hydrochlorothiazide 12.5 mg. She is not monitoring her blood pressure at home. She admits following a low sodium diet. She denies chest pain, SOB, headache, lightheadedness, dizziness, or palpitations. DANNY:   Admits increased snoring and daytime somnolence. She had a sleep study performed [id number]. She wore a CPAP machine in the past and states that it was not beneficial. She is interested in a referral to [facility] for repeat sleep study. Hyperlipidemia:   Current medication regimen includes simvastatin 40mg. She denies side effects with this medication. Current exercise routine includes walking occasionally. She denies well balanced diet. She denies low fat or low cholesterol diet.      Anxiety and Depression:  Current medication regimen includes mirtazapine 30mg qd, duloxetine 30mg qd, sertraline 50mg bid, and alprazolam 1/2 tab 0.25mg prn. She is taking alprazolam only as needed, average once every two weeks. Overall, she states that her mood is \"pretty good\". She is not having extreme anxiety attacks as she was having before. She is currently in [place] helping with her mother's health. She states she is able to handle stressors well for the most part. She admits a positive support system. ROS:   Constitutional: Negative for chills, fatigue, fever and unexpected weight change. HENT: Negative for congestion, rhinorrhea, sinus pressure, sinus pain and sore throat. Respiratory: Positive for cough (Admits dry cough, intermittent). Negative for shortness of breath and wheezing. Cardiovascular: Negative for chest pain and palpitations. Gastrointestinal: Positive for constipation and diarrhea. Negative for abdominal pain. Skin: Negative for rash. Neurological: Negative for dizziness, light-headedness and headaches. Physical Exam  Constitutional:       Appearance: Normal appearance. Neurological:      Mental Status: She is alert and oriented to person, place, and time. Psychiatric:         Mood and Affect: Mood normal.         Behavior: Behavior normal.         Thought Content: Thought content normal.         Judgment: Judgment normal.       Assessment:    Diagnosis Orders   1. Essential hypertension     2. [facility]   3. Obstructive sleep apnea  External Referral To Sleep Medicine   4. Anxiety     5. DANNY (obstructive sleep apnea)     6. Major depressive disorder in partial remission, unspecified whether recurrent ([facility] [address]     7. Hyperlipidemia, unspecified hyperlipidemia type           Plan:   -Discussed varying causes for dry, persistent cough including GERD and allergies.   Will continue to monitor and consider chest x-ray in the future if persistent.  -Patient was previously referred to general surgery for colonoscopy but prefers to have procedure done by GI specialist in [place]. Will place referral today to [address] today.  -The patient admits a history of obstructive sleep apnea for which she wore a CPAP machine 15 years ago. Due to increased snoring and daytime somnolence, will repeat sleep study. Patient prefers to have study performed at [facility]. Referral will be placed.  -Supplied health counseling on well-balanced diet and routine physical exercise  -We will follow up with patient in 4 months with labs prior to appointment.  -Continue current medication regimen. Monitor blood pressure once weekly.  -Contact office if concerns arise. Services were provided through a video synchronous discussion virtually to substitute for in-person clinic visit. --KAREN Zacarias - CNP on 12/18/2020 at 4:28 PM    An electronic signature was used to authenticate this note.

## 2021-01-19 ENCOUNTER — TELEPHONE (OUTPATIENT)
Dept: GASTROENTEROLOGY | Age: 51
End: 2021-01-19

## 2021-02-11 NOTE — TELEPHONE ENCOUNTER
3rd attempt; called pt and LVM regarding referral.    Sending referral back to provider, multiple attempts made to reach pt.

## 2021-02-19 LAB
ABSOLUTE BASO #: 0 X10E9/L (ref 0–0.2)
ABSOLUTE EOS #: 0.2 X10E9/L (ref 0–0.4)
ABSOLUTE LYMPH #: 2 X10E9/L (ref 1–3.5)
ABSOLUTE MONO #: 0.4 X10E9/L (ref 0–0.9)
ABSOLUTE NEUT #: 3.2 X10E9/L (ref 1.5–6.6)
ALBUMIN SERPL-MCNC: 4.5 G/DL (ref 3.2–5.3)
ALK PHOSPHATASE: 85 U/L (ref 39–130)
ALT SERPL-CCNC: 28 U/L (ref 0–31)
ANION GAP SERPL CALCULATED.3IONS-SCNC: 10 MMOL/L (ref 5–15)
AST SERPL-CCNC: 24 U/L (ref 0–41)
AVERAGE GLUCOSE: 100 MG/DL
BASOPHILS RELATIVE PERCENT: 0.8 %
BILIRUB SERPL-MCNC: 0.4 MG/DL (ref 0.3–1.2)
BUN BLDV-MCNC: 17 MG/DL (ref 5–23)
CALCIUM SERPL-MCNC: 9.7 MG/DL (ref 8.5–10.5)
CHLORIDE BLD-SCNC: 103 MMOL/L (ref 98–109)
CHOLESTEROL/HDL RATIO: 4.1 (ref 1–5)
CHOLESTEROL: 191 MG/DL (ref 150–200)
CO2: 28 MMOL/L (ref 22–32)
CREAT SERPL-MCNC: 0.86 MG/DL (ref 0.4–1)
EGFR AFRICAN AMERICAN: >60 ML/MIN/1.73SQ.M
EGFR IF NONAFRICAN AMERICAN: >60 ML/MIN/1.73SQ.M
EOSINOPHILS RELATIVE PERCENT: 3.5 %
GLUCOSE: 93 MG/DL (ref 65–99)
HBA1C MFR BLD: 5.1 % (ref 4.4–6.4)
HCT VFR BLD CALC: 43.5 % (ref 35–47)
HDLC SERPL-MCNC: 47 MG/DL
HEMOGLOBIN: 14.6 G/DL (ref 11.7–15.5)
LDL CHOLESTEROL CALCULATED: 80 MG/DL
LDL/HDL RATIO: 1.7
LYMPHOCYTE %: 34 %
MCH RBC QN AUTO: 31.3 PG (ref 27–34)
MCHC RBC AUTO-ENTMCNC: 33.7 G/DL (ref 32–36)
MCV RBC AUTO: 93 FL (ref 80–100)
MONOCYTES # BLD: 7.2 %
NEUTROPHILS RELATIVE PERCENT: 54.5 %
PDW BLD-RTO: 12.8 % (ref 11.5–15)
PLATELETS: 291 X10E9/L (ref 150–450)
PMV BLD AUTO: 9.2 FL (ref 7–12)
POTASSIUM SERPL-SCNC: 3.9 MMOL/L (ref 3.5–5)
RBC: 4.67 X10E12/L (ref 3.8–5.2)
SODIUM BLD-SCNC: 141 MMOL/L (ref 134–146)
TOTAL PROTEIN: 7.2 G/DL (ref 6–8)
TRIGL SERPL-MCNC: 319 MG/DL (ref 27–150)
TSH SERPL DL<=0.05 MIU/L-ACNC: 3.54 UIU/ML (ref 0.49–4.67)
VITAMIN B-12: 981 PG/ML (ref 180–914)
VITAMIN D 25-HYDROXY: 40 NG/ML (ref 30–100)
VLDLC SERPL CALC-MCNC: 64 MG/DL (ref 0–30)
WBC: 5.9 X10E9/L (ref 4–11)

## 2021-02-23 ENCOUNTER — TELEPHONE (OUTPATIENT)
Dept: FAMILY MEDICINE CLINIC | Age: 51
End: 2021-02-23

## 2021-02-23 NOTE — PROGRESS NOTES
I spoke with pt., notified of previous instructions. She again confirms she was fasting for labs and has been taking meds as prescribed.

## 2021-02-23 NOTE — TELEPHONE ENCOUNTER
----- Message from KAREN Randolph CNP sent at 2/22/2021 11:36 AM EST -----  Please notify that I have reviewed the patient's cardiovascular risk report and will not be initiating medication at this time. However, she should focus on lifestyle modifications including weight loss, aerobic exercise, avoidance of concentrated sugars, and reduction of simple carbohydrates. Will continue to monitor.

## 2021-03-02 RX ORDER — MIRTAZAPINE 30 MG/1
TABLET, FILM COATED ORAL
Qty: 90 TABLET | Refills: 0 | Status: SHIPPED | OUTPATIENT
Start: 2021-03-02 | End: 2021-05-25

## 2021-04-13 LAB
ABSOLUTE BASO #: 0.1 X10E9/L (ref 0–0.2)
ABSOLUTE EOS #: 0.3 X10E9/L (ref 0–0.4)
ABSOLUTE LYMPH #: 2 X10E9/L (ref 1–3.5)
ABSOLUTE MONO #: 0.4 X10E9/L (ref 0–0.9)
ABSOLUTE NEUT #: 3.2 X10E9/L (ref 1.5–6.6)
ALBUMIN SERPL-MCNC: 3.9 G/DL (ref 3.2–5.3)
ALK PHOSPHATASE: 92 U/L (ref 39–130)
ALT SERPL-CCNC: 33 U/L (ref 0–31)
ANION GAP SERPL CALCULATED.3IONS-SCNC: 10 MMOL/L (ref 5–15)
AST SERPL-CCNC: 30 U/L (ref 0–41)
AVERAGE GLUCOSE: 103 MG/DL
BASOPHILS RELATIVE PERCENT: 1.1 %
BILIRUB SERPL-MCNC: 0.4 MG/DL (ref 0.3–1.2)
BUN BLDV-MCNC: 16 MG/DL (ref 5–23)
CALCIUM SERPL-MCNC: 9.4 MG/DL (ref 8.5–10.5)
CHLORIDE BLD-SCNC: 106 MMOL/L (ref 98–109)
CHOLESTEROL/HDL RATIO: 4.1 (ref 1–5)
CHOLESTEROL: 202 MG/DL (ref 150–200)
CO2: 27 MMOL/L (ref 22–32)
CREAT SERPL-MCNC: 0.86 MG/DL (ref 0.4–1)
EGFR AFRICAN AMERICAN: >60 ML/MIN/1.73SQ.M
EGFR IF NONAFRICAN AMERICAN: >60 ML/MIN/1.73SQ.M
EOSINOPHILS RELATIVE PERCENT: 5.4 %
GLUCOSE: 83 MG/DL (ref 65–99)
HBA1C MFR BLD: 5.2 % (ref 4.4–6.4)
HCT VFR BLD CALC: 42.8 % (ref 35–47)
HDLC SERPL-MCNC: 49 MG/DL
HEMOGLOBIN: 15.1 G/DL (ref 11.7–15.5)
LDL CHOLESTEROL CALCULATED: ABNORMAL MG/DL
LDL CHOLESTEROL DIRECT: 100 MG/DL
LDL/HDL RATIO: ABNORMAL
LYMPHOCYTE %: 33.3 %
MCH RBC QN AUTO: 32.7 PG (ref 27–34)
MCHC RBC AUTO-ENTMCNC: 35.3 G/DL (ref 32–36)
MCV RBC AUTO: 93 FL (ref 80–100)
MONOCYTES # BLD: 6.4 %
NEUTROPHILS RELATIVE PERCENT: 53.8 %
PDW BLD-RTO: 12.6 % (ref 11.5–15)
PLATELETS: 249 X10E9/L (ref 150–450)
PMV BLD AUTO: 9.5 FL (ref 7–12)
POTASSIUM SERPL-SCNC: 4 MMOL/L (ref 3.5–5)
RBC: 4.62 X10E12/L (ref 3.8–5.2)
SODIUM BLD-SCNC: 143 MMOL/L (ref 134–146)
TOTAL PROTEIN: 7.3 G/DL (ref 6–8)
TRIGL SERPL-MCNC: 407 MG/DL (ref 27–150)
TSH SERPL DL<=0.05 MIU/L-ACNC: 3.07 UIU/ML (ref 0.49–4.67)
VITAMIN B-12: 994 PG/ML (ref 180–914)
VITAMIN D 25-HYDROXY: 39.9 NG/ML (ref 30–100)
VLDLC SERPL CALC-MCNC: 81 MG/DL (ref 0–30)
WBC: 5.9 X10E9/L (ref 4–11)

## 2021-04-19 ENCOUNTER — OFFICE VISIT (OUTPATIENT)
Dept: FAMILY MEDICINE CLINIC | Age: 51
End: 2021-04-19
Payer: COMMERCIAL

## 2021-04-19 VITALS
SYSTOLIC BLOOD PRESSURE: 122 MMHG | HEIGHT: 67 IN | DIASTOLIC BLOOD PRESSURE: 88 MMHG | WEIGHT: 183 LBS | BODY MASS INDEX: 28.72 KG/M2 | HEART RATE: 109 BPM | OXYGEN SATURATION: 100 %

## 2021-04-19 DIAGNOSIS — I10 ESSENTIAL HYPERTENSION: Primary | ICD-10-CM

## 2021-04-19 DIAGNOSIS — G47.33 OBSTRUCTIVE SLEEP APNEA: ICD-10-CM

## 2021-04-19 DIAGNOSIS — R74.8 ELEVATED LIVER ENZYMES: ICD-10-CM

## 2021-04-19 DIAGNOSIS — F41.9 ANXIETY: ICD-10-CM

## 2021-04-19 DIAGNOSIS — E78.5 HYPERLIPIDEMIA, UNSPECIFIED HYPERLIPIDEMIA TYPE: ICD-10-CM

## 2021-04-19 DIAGNOSIS — F32.4 MAJOR DEPRESSIVE DISORDER IN PARTIAL REMISSION, UNSPECIFIED WHETHER RECURRENT (HCC): ICD-10-CM

## 2021-04-19 PROCEDURE — G8427 DOCREV CUR MEDS BY ELIG CLIN: HCPCS | Performed by: NURSE PRACTITIONER

## 2021-04-19 PROCEDURE — 99214 OFFICE O/P EST MOD 30 MIN: CPT | Performed by: NURSE PRACTITIONER

## 2021-04-19 PROCEDURE — 1036F TOBACCO NON-USER: CPT | Performed by: NURSE PRACTITIONER

## 2021-04-19 PROCEDURE — 3017F COLORECTAL CA SCREEN DOC REV: CPT | Performed by: NURSE PRACTITIONER

## 2021-04-19 PROCEDURE — G8419 CALC BMI OUT NRM PARAM NOF/U: HCPCS | Performed by: NURSE PRACTITIONER

## 2021-04-19 RX ORDER — FENOFIBRATE 48 MG/1
48 TABLET, COATED ORAL DAILY
Qty: 90 TABLET | Refills: 1 | Status: SHIPPED | OUTPATIENT
Start: 2021-04-19 | End: 2021-09-27

## 2021-04-19 RX ORDER — IBUPROFEN 800 MG/1
TABLET ORAL
Qty: 90 TABLET | Refills: 0 | Status: CANCELLED | OUTPATIENT
Start: 2021-04-19

## 2021-04-19 RX ORDER — BUTALBITAL, ACETAMINOPHEN AND CAFFEINE 50; 325; 40 MG/1; MG/1; MG/1
TABLET ORAL
Qty: 30 TABLET | Refills: 0 | Status: SHIPPED | OUTPATIENT
Start: 2021-04-19 | End: 2021-09-15

## 2021-04-19 RX ORDER — ALPRAZOLAM 0.25 MG/1
TABLET ORAL
Qty: 30 TABLET | Refills: 0 | Status: SHIPPED | OUTPATIENT
Start: 2021-04-19 | End: 2021-08-10 | Stop reason: SDUPTHER

## 2021-04-19 RX ORDER — ADHESIVE BANDAGE 3/4"
1 BANDAGE TOPICAL DAILY
Qty: 1 EACH | Refills: 0 | Status: SHIPPED | OUTPATIENT
Start: 2021-04-19

## 2021-04-19 RX ORDER — IBUPROFEN 800 MG/1
TABLET ORAL
Qty: 60 TABLET | Refills: 1 | Status: SHIPPED | OUTPATIENT
Start: 2021-04-19

## 2021-04-19 RX ORDER — BUTALBITAL, ACETAMINOPHEN AND CAFFEINE 50; 325; 40 MG/1; MG/1; MG/1
TABLET ORAL
Qty: 30 TABLET | Refills: 0 | Status: CANCELLED | OUTPATIENT
Start: 2021-04-19

## 2021-04-19 SDOH — ECONOMIC STABILITY: TRANSPORTATION INSECURITY
IN THE PAST 12 MONTHS, HAS THE LACK OF TRANSPORTATION KEPT YOU FROM MEDICAL APPOINTMENTS OR FROM GETTING MEDICATIONS?: NO

## 2021-04-19 SDOH — ECONOMIC STABILITY: TRANSPORTATION INSECURITY
IN THE PAST 12 MONTHS, HAS LACK OF TRANSPORTATION KEPT YOU FROM MEETINGS, WORK, OR FROM GETTING THINGS NEEDED FOR DAILY LIVING?: NO

## 2021-04-19 SDOH — ECONOMIC STABILITY: FOOD INSECURITY: WITHIN THE PAST 12 MONTHS, THE FOOD YOU BOUGHT JUST DIDN'T LAST AND YOU DIDN'T HAVE MONEY TO GET MORE.: NEVER TRUE

## 2021-04-19 ASSESSMENT — ENCOUNTER SYMPTOMS: SHORTNESS OF BREATH: 0

## 2021-04-19 NOTE — PROGRESS NOTES
Name: Oneil Fleming  : 1970         Chief Complaint:     Chief Complaint   Patient presents with    Follow-up     patient would like to talk about current medications and doses. History of Present Illness:      Oneil Fleming is a 48 y.o.  female who presents with Follow-up (patient would like to talk about current medications and doses. )      HPI     Hypertension:   Current medication regimen includes losartan 50mg and hydrochlorothiazide 12.5mg. She is not monitoring her blood pressure at home. She denies a well balanced diet. Since the pandemic started she has increased the amount of food she has been consuming due to stress. She admits following a low-sodium diet. For physical activity, she notes occasionally walking. She denies chest pain, shortness of breath, vision changes, palpitations, lightheadedness, or dizziness. Admits headaches daily. \"Bad headaches\" occur 1-2 monthly. Relieved with Aleve, Fioricet, or ibuprofen. Anxiety:   Current medication includes Cymbalta 30mg, mirtazapine 30mg, and sertraline 50mg. She admits to taking xanax 1-2 times monthly only as needed. She is planning to go to [place] over the summer to be with her sister and mother. She notes increased stress related to the pandemic and her family. She is currently enrolled in therapy and states this is going well. Overall, symptoms are manageable, per patient. Hyperlipidemia:   Current medication includes simvastatin 40mg.      Past Medical History:     Past Medical History:   Diagnosis Date    Depression     Headache     Hyperlipidemia     Hypertension     Hypothyroidism     Insomnia     Irritable bowel syndrome       Reviewed all health maintenance requirements and ordered appropriate tests  Health Maintenance Due   Topic Date Due    Cervical cancer screen  2018    Colon cancer screen colonoscopy  Never done       Past Surgical History:     Past Surgical History:   Procedure Laterality Date [address]    L1 or L2 shattered and repaired    COLONOSCOPY  2004/2005    COLPOSCOPY  03/30/2011    C1N1        Medications:       Prior to Admission medications    Medication Sig Start Date End Date Taking?  Authorizing Provider   butalbital-acetaminophen-caffeine (FIORICET, ESGIC) -07 MG per tablet Take 1 tablet by mouth every 4 hours as needed; do not exceed 6 tablets daily 4/19/21  Yes Yared Piña, APRN - CNP   ibuprofen (ADVIL;MOTRIN) 800 MG tablet TAKE 1 TABLET EVERY 8 HOURS AS NEEDED 4/19/21  Yes Yared Piña, APRN - CNP   fenofibrate (TRICOR) 48 MG tablet Take 1 tablet by mouth daily 4/19/21  Yes Yared Piña APRN - ROBERT   ALPRAZolam Bobbette Jansky) 0.25 MG tablet Take 1/2 tablet by mouth three times a day as needed for anxiety 4/19/21 5/19/21 Yes Yared Piña APRN - CNP   Blood Pressure Monitoring (BLOOD PRESSURE CUFF) MISC 1 kit by Does not apply route daily 4/19/21  Yes Yared Piña APRN - CNP   mirtazapine (REMERON) 30 MG tablet TAKE 1 TABLET BY MOUTH EVERY DAY AT NIGHT 3/2/21  Yes Yared Piña APRN - CNP   simvastatin (ZOCOR) 40 MG tablet TAKE 1 TABLET BY MOUTH DAILY IN THE EVENING 12/17/20  Yes Grant Richard MD   DULoxetine (CYMBALTA) 30 MG extended release capsule TAKE 1 CAPSULE BY MOUTH EVERY DAY 12/10/20  Yes Grant Richard MD   vitamin B-12 (CYANOCOBALAMIN) 500 MCG tablet TAKE 1 TABLET BY MOUTH EVERY DAY 11/23/20  Yes Grant Richard MD   losartan (COZAAR) 50 MG tablet TAKE 1 TABLET BY MOUTH EVERY DAY 7/23/20  Yes Grant Richard MD   sertraline (ZOLOFT) 50 MG tablet TAKE 2 TABLETS BY MOUTH EVERY DAY 7/7/20  Yes Grant Richard MD   levothyroxine (SYNTHROID) 100 MCG tablet TAKE 1 TABLET BY MOUTH EVERY DAY 6/29/20  Yes Grant Richard MD   hydrochlorothiazide (HYDRODIURIL) 12.5 MG tablet TAKE 1 TABLET BY MOUTH EVERY DAY 6/29/20  Yes Grant Richard MD   Cholecalciferol (VITAMIN D3) 25 MCG (1000 UT) TABS Take 2 tablets by mouth daily 2/7/20  Yes Grace Maxwell MD   Na Sulfate-K Sulfate-Mg Sulf (SUPREP BOWEL PREP KIT) 17.5-3.13-1.6 GM/177ML SOLN Take as directed  Patient not taking: Reported on 9/18/2020 8/26/20   Hair Benavidez MD   Cyanocobalamin (VITAMIN B 12 PO) Take by mouth    Historical Provider, MD        Allergies:       Patient has no known allergies. Social History:     Tobacco:    reports that she has never smoked. She has never used smokeless tobacco.  Alcohol:      reports no history of alcohol use. Drug Use:  has no history on file for drug. Family History:     Family History   Problem Relation Age of Onset    Cancer Maternal Aunt         breast       Review of Systems:     Positive and Negative as described in HPI    Review of Systems   Eyes: Negative for visual disturbance. Respiratory: Negative for shortness of breath. Cardiovascular: Negative for chest pain and palpitations. Neurological: Positive for headaches. Negative for dizziness and light-headedness. Psychiatric/Behavioral: The patient is nervous/anxious. Physical Exam:   Vitals:  /88   Pulse 109   Ht 5' 7\" (1.702 m)   Wt 183 lb (83 kg)   SpO2 100%   BMI 28.66 kg/m²     Physical Exam  Constitutional:       General: She is not in acute distress. Appearance: Normal appearance. She is obese. She is not ill-appearing or toxic-appearing. HENT:      Head: Normocephalic. Cardiovascular:      Rate and Rhythm: Regular rhythm. Tachycardia present. Heart sounds: Normal heart sounds. No murmur. Pulmonary:      Effort: Pulmonary effort is normal. No respiratory distress. Breath sounds: Normal breath sounds. No stridor. No wheezing, rhonchi or rales. Abdominal:      General: Abdomen is flat. Bowel sounds are normal. There is no distension. Palpations: Abdomen is soft. There is no mass. Tenderness: There is no abdominal tenderness. There is no guarding. Hernia: No hernia is present.    Neurological:      Mental Status: She is alert and oriented to person, place, and time. Psychiatric:         Mood and Affect: Mood normal.         Behavior: Behavior normal.         Thought Content: Thought content normal.         Judgment: Judgment normal.         Data:     Lab Results   Component Value Date     04/13/2021    K 4.0 04/13/2021     04/13/2021    CO2 27 04/13/2021    BUN 16 04/13/2021    CREATININE 0.86 04/13/2021    GLUCOSE 83 04/13/2021    PROT 7.3 04/13/2021    LABALBU 3.9 04/13/2021    BILITOT 0.4 04/13/2021    ALKPHOS 92 04/13/2021    AST 30 04/13/2021    ALT 33 04/13/2021     Lab Results   Component Value Date    WBC 5.9 04/13/2021    RBC 4.62 04/13/2021    HGB 15.1 04/13/2021    HCT 42.8 04/13/2021    MCV 93 04/13/2021    MCH 32.7 04/13/2021    MCHC 35.3 04/13/2021    RDW 12.6 04/13/2021     04/13/2021    MPV 9.5 04/13/2021     Lab Results   Component Value Date    TSH 3.07 04/13/2021     Lab Results   Component Value Date    CHOL 202 04/13/2021    CHOL 188 07/01/2015    HDL 49 04/13/2021    LABA1C 5.2 04/13/2021       Assessment/Plan:      Diagnosis Orders   1. Essential hypertension     2. Elevated liver enzymes  ALT    AST   3. Hyperlipidemia, unspecified hyperlipidemia type  Lipid Panel   4. Anxiety  ALPRAZolam (XANAX) 0.25 MG tablet   5. Obstructive sleep apnea     6. Major depressive disorder in partial remission, unspecified whether recurrent ([facility] [address]         Wellness:   -Planning to complete Pap smear at [facility] in [place] [address] scheduled 5/12/21  -Following with [address] [place] [address] for obstructive sleep apnea  -Will continue to monitor fatigue symptoms and consider referral to rheumatology as patient notes potential history of fibromyalgia discussed with her previous provider  -Reviewed labs from 4/2021 that showed elevated triglycerides of 407. Continue simvastatin 40 mg. Initiate fenofibrate 48 mg due to continuous elevated triglycerides.   Counseled on routine physical activity and specific diet to lower cholesterol and triglycerides. Hypertension:  -Stable, order for blood pressure cuff placed today. Anxiety:  -PDMP reviewed. Refill Xanax.  -Encouraged yoga  -Patient declines interest in altering medication doses as she states symptoms are stable at this time. Completed Refills   Requested Prescriptions     Signed Prescriptions Disp Refills    butalbital-acetaminophen-caffeine (FIORICET, ESGIC) -40 MG per tablet 30 tablet 0     Sig: Take 1 tablet by mouth every 4 hours as needed; do not exceed 6 tablets daily    ibuprofen (ADVIL;MOTRIN) 800 MG tablet 60 tablet 1     Sig: TAKE 1 TABLET EVERY 8 HOURS AS NEEDED    fenofibrate (TRICOR) 48 MG tablet 90 tablet 1     Sig: Take 1 tablet by mouth daily    ALPRAZolam (XANAX) 0.25 MG tablet 30 tablet 0     Sig: Take 1/2 tablet by mouth three times a day as needed for anxiety    Blood Pressure Monitoring (BLOOD PRESSURE CUFF) MISC 1 each 0     Si kit by Does not apply route daily       Orders Placed This Encounter   Procedures    ALT     Standing Status:   Future     Standing Expiration Date:   2022    AST     Standing Status:   Future     Standing Expiration Date:   2022    Lipid Panel     Standing Status:   Future     Standing Expiration Date:   2022     Order Specific Question:   Is Patient Fasting?/# of Hours     Answer:   fasting        No results found for this visit on 21. Return in about 4 months (around 2021), or if symptoms worsen or fail to improve, for f/u.     Electronically signed by KAREN Jamison CNP on 21 at 4:32 PM.

## 2021-04-19 NOTE — PATIENT INSTRUCTIONS
SURVEY:    You may be receiving a survey from [employer] regarding your visit today. Please complete the survey to enable us to provide the highest quality of care to you and your family. If you cannot score us a very good on any question, please call the office to discuss how we could of made your experience a very good one. Thank you.

## 2021-05-12 ENCOUNTER — HOSPITAL ENCOUNTER (OUTPATIENT)
Dept: WOMENS IMAGING | Age: 51
Discharge: HOME | End: 2021-05-14
Payer: COMMERCIAL

## 2021-05-12 DIAGNOSIS — Z12.31 ENCOUNTER FOR SCREENING MAMMOGRAM FOR MALIGNANT NEOPLASM OF BREAST: ICD-10-CM

## 2021-05-12 PROCEDURE — 77063 BREAST TOMOSYNTHESIS BI: CPT

## 2021-05-25 RX ORDER — LEVOTHYROXINE SODIUM 0.1 MG/1
TABLET ORAL
Qty: 90 TABLET | Refills: 3 | Status: SHIPPED | OUTPATIENT
Start: 2021-05-25 | End: 2022-05-16

## 2021-05-25 RX ORDER — HYDROCHLOROTHIAZIDE 12.5 MG/1
TABLET ORAL
Qty: 90 TABLET | Refills: 3 | Status: SHIPPED | OUTPATIENT
Start: 2021-05-25 | End: 2022-05-16

## 2021-05-25 RX ORDER — MIRTAZAPINE 30 MG/1
TABLET, FILM COATED ORAL
Qty: 90 TABLET | Refills: 1 | Status: SHIPPED | OUTPATIENT
Start: 2021-05-25 | End: 2022-01-04

## 2021-05-25 NOTE — TELEPHONE ENCOUNTER
Health Maintenance   Topic Date Due    Cervical cancer screen  01/06/2018    DTaP/Tdap/Td vaccine (2 - Td) 04/19/2022 (Originally 3/9/2021)    Shingles Vaccine (1 of 2) 04/19/2022 (Originally 5/9/2020)    Hepatitis C screen  04/19/2022 (Originally 1970)    HIV screen  04/19/2022 (Originally 5/9/1985)    Lipid screen  04/13/2022    TSH testing  04/13/2022    Potassium monitoring  04/13/2022    Creatinine monitoring  04/13/2022    Breast cancer screen  05/12/2023    Colon cancer screen colonoscopy  02/23/2031    Flu vaccine  Completed    COVID-19 Vaccine  Completed    Hepatitis A vaccine  Aged Out    Hepatitis B vaccine  Aged Out    Hib vaccine  Aged Out    Meningococcal (ACWY) vaccine  Aged Out    Pneumococcal 0-64 years Vaccine  Aged Out             (applicable per patient's age: Cancer Screenings, Depression Screening, Fall Risk Screening, Immunizations)    Hemoglobin A1C (%)   Date Value   04/13/2021 5.2   02/19/2021 5.1   08/13/2020 5.2     LDL Cholesterol (mg/dL)   Date Value   08/28/2014 81     LDL Calculated (mg/dL)   Date Value   04/13/2021 See Note     AST (U/L)   Date Value   04/13/2021 30     ALT (U/L)   Date Value   04/13/2021 33 (H)     BUN (mg/dL)   Date Value   04/13/2021 16      (goal A1C is < 7)   (goal LDL is <100) need 30-50% reduction from baseline     BP Readings from Last 3 Encounters:   04/19/21 122/88   09/18/20 124/80   08/18/20 98/60    (goal /80)      All Future Testing planned in [facility]:  Lab Frequency Next Occurrence   COLONOSCOPY W/ OR W/O BIOPSY Once 08/26/2020   ALT Once 08/19/2021   AST Once 08/19/2021   Lipid Panel Once 08/19/2021       Next Visit Date:  Future Appointments   Date Time Provider Jeovany Mcmahon   8/10/2021  9:00 AM Jennifer Ace, APRN - CNP [facility]            Patient Active Problem List:     Hyperlipidemia     Insomnia     Essential hypertension     Hypothyroidism     Migraine headache     Chronic cough     Anxiety     DANNY

## 2021-08-09 LAB
ALT SERPL-CCNC: 24 U/L (ref 0–31)
AST SERPL-CCNC: 21 U/L (ref 0–41)
CHOLESTEROL/HDL RATIO: 3.5 (ref 1–5)
CHOLESTEROL: 211 MG/DL (ref 150–200)
HDLC SERPL-MCNC: 60 MG/DL
LDL CHOLESTEROL CALCULATED: 103 MG/DL
LDL/HDL RATIO: 1.7
TRIGL SERPL-MCNC: 240 MG/DL (ref 27–150)
VLDLC SERPL CALC-MCNC: 48 MG/DL (ref 0–30)

## 2021-08-10 ENCOUNTER — OFFICE VISIT (OUTPATIENT)
Dept: FAMILY MEDICINE CLINIC | Age: 51
End: 2021-08-10
Payer: COMMERCIAL

## 2021-08-10 VITALS
BODY MASS INDEX: 28.41 KG/M2 | DIASTOLIC BLOOD PRESSURE: 86 MMHG | HEART RATE: 102 BPM | TEMPERATURE: 97 F | HEIGHT: 67 IN | WEIGHT: 181 LBS | SYSTOLIC BLOOD PRESSURE: 122 MMHG | OXYGEN SATURATION: 99 %

## 2021-08-10 DIAGNOSIS — E78.5 HYPERLIPIDEMIA, UNSPECIFIED HYPERLIPIDEMIA TYPE: ICD-10-CM

## 2021-08-10 DIAGNOSIS — F32.4 MAJOR DEPRESSIVE DISORDER IN PARTIAL REMISSION, UNSPECIFIED WHETHER RECURRENT (HCC): ICD-10-CM

## 2021-08-10 DIAGNOSIS — F41.9 ANXIETY: ICD-10-CM

## 2021-08-10 DIAGNOSIS — I10 ESSENTIAL HYPERTENSION: Primary | ICD-10-CM

## 2021-08-10 PROCEDURE — 99214 OFFICE O/P EST MOD 30 MIN: CPT | Performed by: NURSE PRACTITIONER

## 2021-08-10 RX ORDER — ALPRAZOLAM 0.25 MG/1
TABLET ORAL
Qty: 30 TABLET | Refills: 0 | Status: SHIPPED | OUTPATIENT
Start: 2021-08-10 | End: 2021-12-15

## 2021-08-10 ASSESSMENT — ENCOUNTER SYMPTOMS: SHORTNESS OF BREATH: 0

## 2021-08-10 NOTE — PATIENT INSTRUCTIONS
SURVEY:    You may be receiving a survey from [employer] regarding your visit today. Please complete the survey to enable us to provide the highest quality of care to you and your family. If you cannot score us a very good on any question, please call the office to discuss how we could of made your experience a very good one. Thank you.

## 2021-08-10 NOTE — PROGRESS NOTES
Name: Jose Woody  : 1970         Chief Complaint:     Chief Complaint   Patient presents with    Follow-up     denies any issues.  Anxiety     has not started yoga yet. plans on doing it soon.  Hypertension     has not got a cuff yet. History of Present Illness:      Jose Woody is a 46 y.o.  female who presents with Follow-up (denies any issues. ), Anxiety (has not started yoga yet. plans on doing it soon. ), and Hypertension (has not got a cuff yet.)      HPI     Hypertension:  Current medication regimen includes losartan 50mg and hydrochlorothiazide 12.5mg. She denies monitoring her blood pressure at home. She admits a well balanced diet. She admits following a low-sodium diet. For physical activity, she notes none. She denies chest pain, shortness of breath, vision changes, palpitations, lightheadedness, or dizziness. Admits headaches. Depression:  Admits concerns with adapting as she travels back and forth from [place] and [place]. She admits multiple stressors. She is enrolled with a counselor and this is beneficial. She is taking xanax \"not often\". Past Medical History:     Past Medical History:   Diagnosis Date    Depression     Headache     Hyperlipidemia     Hypertension     Hypothyroidism     Insomnia     Irritable bowel syndrome       Reviewed all health maintenance requirements and ordered appropriate tests  Health Maintenance Due   Topic Date Due    Cervical cancer screen  2018       Past Surgical History:     Past Surgical History:   Procedure Laterality Date    APPENDECTOMY  12    BACK SURGERY      L1 or L2 shattered and repaired    COLONOSCOPY      COLPOSCOPY  2011    C1N1        Medications:       Prior to Admission medications    Medication Sig Start Date End Date Taking?  Authorizing Provider   ALPRAZoeze Briggs) 0.25 MG tablet Take 1/2 tablet by mouth three times a day as needed for anxiety 8/10/21 9/9/21 Yes Brigette Robin KAREN Wang CNP   losartan (COZAAR) 50 MG tablet TAKE 1 TABLET BY MOUTH EVERY DAY 7/19/21  Yes Marino Barry MD   mirtazapine (REMERON) 30 MG tablet TAKE 1 TABLET BY MOUTH EVERY DAY AT NIGHT 5/25/21  Yes KAREN Araujo CNP   hydroCHLOROthiazide (HYDRODIURIL) 12.5 MG tablet TAKE 1 TABLET BY MOUTH EVERY DAY 5/25/21  Yes Marino Barry MD   levothyroxine (SYNTHROID) 100 MCG tablet TAKE 1 TABLET BY MOUTH EVERY DAY 5/25/21  Yes Marino Barry MD   butalbital-acetaminophen-caffeine (FIORICET, ESGIC) -29 MG per tablet Take 1 tablet by mouth every 4 hours as needed; do not exceed 6 tablets daily 4/19/21  Yes KAREN Araujo CNP   ibuprofen (ADVIL;MOTRIN) 800 MG tablet TAKE 1 TABLET EVERY 8 HOURS AS NEEDED 4/19/21  Yes KAREN Araujo CNP   fenofibrate (TRICOR) 48 MG tablet Take 1 tablet by mouth daily 4/19/21  Yes KAREN Araujo CNP   Blood Pressure Monitoring (BLOOD PRESSURE CUFF) MISC 1 kit by Does not apply route daily 4/19/21  Yes KAREN Araujo CNP   simvastatin (ZOCOR) 40 MG tablet TAKE 1 TABLET BY MOUTH DAILY IN THE EVENING 12/17/20  Yes Marino Barry MD   DULoxetine (CYMBALTA) 30 MG extended release capsule TAKE 1 CAPSULE BY MOUTH EVERY DAY 12/10/20  Yes Marino Barry MD   vitamin B-12 (CYANOCOBALAMIN) 500 MCG tablet TAKE 1 TABLET BY MOUTH EVERY DAY 11/23/20  Yes Marino Barry MD   sertraline (ZOLOFT) 50 MG tablet TAKE 2 TABLETS BY MOUTH EVERY DAY 7/7/20  Yes Marino Barry MD   Cholecalciferol (VITAMIN D3) 25 MCG (1000 UT) TABS Take 2 tablets by mouth daily 2/7/20  Yes Marino Barry MD   Cyanocobalamin (VITAMIN B 12 PO) Take by mouth   Yes Historical Provider, MD   Na Sulfate-K Sulfate-Mg Sulf (SUPREP BOWEL PREP KIT) 17.5-3.13-1.6 GM/177ML SOLN Take as directed  Patient not taking: Reported on 9/18/2020 8/26/20   Richy Simmons MD        Allergies:       Patient has no known allergies. Social History:     Tobacco:    reports that she has never smoked. She has never used smokeless tobacco.  Alcohol:      reports no history of alcohol use. Drug Use:  has no history on file for drug use. Family History:     Family History   Problem Relation Age of Onset    Cancer Maternal Aunt         breast       Review of Systems:     Positive and Negative as described in HPI    Review of Systems   Eyes: Negative for visual disturbance. Respiratory: Negative for shortness of breath. Cardiovascular: Negative for chest pain and palpitations. Neurological: Positive for headaches. Negative for dizziness and light-headedness. Physical Exam:   Vitals:  /86   Pulse 102   Temp 97 °F (36.1 °C)   Ht 5' 7\" (1.702 m)   Wt 181 lb (82.1 kg)   SpO2 99%   BMI 28.35 kg/m²     Physical Exam  Constitutional:       General: She is not in acute distress. Appearance: Normal appearance. She is obese. She is not ill-appearing or toxic-appearing. Cardiovascular:      Rate and Rhythm: Regular rhythm. Tachycardia present. Heart sounds: Normal heart sounds. No murmur heard. Pulmonary:      Effort: Pulmonary effort is normal. No respiratory distress. Breath sounds: Normal breath sounds. No stridor. No wheezing, rhonchi or rales. Neurological:      Mental Status: She is alert. Psychiatric:         Behavior: Behavior normal.         Thought Content:  Thought content normal.         Judgment: Judgment normal.      Comments: tearful         Data:     Lab Results   Component Value Date     04/13/2021    K 4.0 04/13/2021     04/13/2021    CO2 27 04/13/2021    BUN 16 04/13/2021    CREATININE 0.86 04/13/2021    GLUCOSE 83 04/13/2021    PROT 7.3 04/13/2021    LABALBU 3.9 04/13/2021    BILITOT 0.4 04/13/2021    ALKPHOS 92 04/13/2021    AST 21 08/09/2021    ALT 24 08/09/2021     Lab Results   Component Value Date    WBC 5.9 04/13/2021    RBC 4.62 04/13/2021    HGB 15.1 04/13/2021    HCT 42.8 04/13/2021    MCV 93 04/13/2021    MCH 32.7 04/13/2021    MCHC 35.3 04/13/2021    RDW 12.6 04/13/2021     04/13/2021    MPV 9.5 04/13/2021     Lab Results   Component Value Date    TSH 3.07 04/13/2021     Lab Results   Component Value Date    CHOL 211 08/09/2021    CHOL 188 07/01/2015    HDL 60 08/09/2021    LABA1C 5.2 04/13/2021       Assessment/Plan:      Diagnosis Orders   1. Essential hypertension     2. Anxiety  ALPRAZolam (XANAX) 0.25 MG tablet   3. Hyperlipidemia, unspecified hyperlipidemia type     4. Major depressive disorder in partial remission, unspecified whether recurrent ([facility]         Hypertension:  -Stable on current medications. No changes at this time. -Reviewed lifestyle modifications to assist with lowering blood pressure including weight loss, healthy diet, exercising routinely, low-sodium diet, limiting caffeine and alcohol, and encouraging stress relief techniques. Depression:  -Discussed opportunity to alter medications to allow for improvement in mood symptoms. Patient declines at this time as she recently moved back to [place] and is going through many changes.  -She questions effectiveness of mirtazapine. She will decrease this from 30 mg to 15 mg nightly. Call office with any concerns.   -Encouraged continuation of counseling  -Offered referral to psychiatry, patient declines at this time  -Refill Xanax prescribed today. PDMP reviewed. Hyperlipidemia:  -Reviewed labs from 8/9/2021 showing elevated cholesterol. Counseled on weight loss, routine physical activity, and low-cholesterol diet. Completed Refills   Requested Prescriptions     Signed Prescriptions Disp Refills    ALPRAZolam (XANAX) 0.25 MG tablet 30 tablet 0     Sig: Take 1/2 tablet by mouth three times a day as needed for anxiety       No orders of the defined types were placed in this encounter. No results found for this visit on 08/10/21. Return in about 3 months (around 11/10/2021), or if symptoms worsen or fail to improve, for f/u .     Electronically signed by KAREN Tian CNP on 08/10/21 at 12:04 PM.

## 2021-08-13 NOTE — TELEPHONE ENCOUNTER
Health Maintenance   Topic Date Due    Cervical cancer screen  01/06/2018    DTaP/Tdap/Td vaccine (2 - Td or Tdap) 04/19/2022 (Originally 3/9/2021)    Shingles Vaccine (1 of 2) 04/19/2022 (Originally 5/9/2020)    Hepatitis C screen  04/19/2022 (Originally 1970)    HIV screen  04/19/2022 (Originally 5/9/1985)    Flu vaccine (1) 09/01/2021    TSH testing  04/13/2022    Potassium monitoring  04/13/2022    Creatinine monitoring  04/13/2022    Lipid screen  08/09/2022    Breast cancer screen  05/12/2023    Colon cancer screen colonoscopy  02/23/2031    COVID-19 Vaccine  Completed    Hepatitis A vaccine  Aged Out    Hepatitis B vaccine  Aged Out    Hib vaccine  Aged Out    Meningococcal (ACWY) vaccine  Aged Out    Pneumococcal 0-64 years Vaccine  Aged Out             (applicable per patient's age: Cancer Screenings, Depression Screening, Fall Risk Screening, Immunizations)    Hemoglobin A1C (%)   Date Value   04/13/2021 5.2   02/19/2021 5.1   08/13/2020 5.2     LDL Cholesterol (mg/dL)   Date Value   08/28/2014 81     LDL Calculated (mg/dL)   Date Value   08/09/2021 103     AST (U/L)   Date Value   08/09/2021 21     ALT (U/L)   Date Value   08/09/2021 24     BUN (mg/dL)   Date Value   04/13/2021 16      (goal A1C is < 7)   (goal LDL is <100) need 30-50% reduction from baseline     BP Readings from Last 3 Encounters:   08/10/21 122/86   04/19/21 122/88   09/18/20 124/80    (goal /80)      All Future Testing planned in [facility]:  Lab Frequency Next Occurrence   COLONOSCOPY W/ OR W/O BIOPSY Once 08/26/2020       Next Visit Date:  Future Appointments   Date Time Provider Jeovany Mcmahon   11/10/2021  3:40 PM KAREN Babcock - CNP [facility]            Patient Active Problem List:     Hyperlipidemia     Insomnia     Essential hypertension     Hypothyroidism     Migraine headache     Chronic cough     Anxiety     DANNY (obstructive sleep apnea)     Positive depression screening Episode of recurrent major depressive disorder ([facility]     Depression, major, in remission ([facility]     Hyperglycemia     Vitamin D deficiency     Fibromyalgia     Other chronic pain

## 2021-08-17 RX ORDER — SERTRALINE HYDROCHLORIDE 100 MG/1
150 TABLET, FILM COATED ORAL DAILY
Qty: 135 TABLET | Refills: 1 | Status: SHIPPED | OUTPATIENT
Start: 2021-08-17 | End: 2021-08-18 | Stop reason: CLARIF

## 2021-08-18 RX ORDER — SERTRALINE HYDROCHLORIDE 100 MG/1
100 TABLET, FILM COATED ORAL DAILY
Qty: 90 TABLET | Refills: 1 | Status: SHIPPED | OUTPATIENT
Start: 2021-08-18 | End: 2021-11-29

## 2021-09-15 RX ORDER — BUTALBITAL, ACETAMINOPHEN AND CAFFEINE 50; 325; 40 MG/1; MG/1; MG/1
TABLET ORAL
Qty: 30 TABLET | Refills: 0 | Status: SHIPPED | OUTPATIENT
Start: 2021-09-15 | End: 2022-05-23

## 2021-09-15 NOTE — TELEPHONE ENCOUNTER
Health Maintenance   Topic Date Due    Cervical cancer screen  01/07/2015    Flu vaccine (1) 09/01/2021    DTaP/Tdap/Td vaccine (2 - Td or Tdap) 04/19/2022 (Originally 3/9/2021)    Shingles Vaccine (1 of 2) 04/19/2022 (Originally 5/9/2020)    Hepatitis C screen  04/19/2022 (Originally 1970)    HIV screen  04/19/2022 (Originally 5/9/1985)    TSH testing  04/13/2022    Potassium monitoring  04/13/2022    Creatinine monitoring  04/13/2022    Lipid screen  08/09/2022    Breast cancer screen  05/12/2023    Colon cancer screen colonoscopy  02/23/2031    COVID-19 Vaccine  Completed    Hepatitis A vaccine  Aged Out    Hepatitis B vaccine  Aged Out    Hib vaccine  Aged Out    Meningococcal (ACWY) vaccine  Aged Out    Pneumococcal 0-64 years Vaccine  Aged Out             (applicable per patient's age: Cancer Screenings, Depression Screening, Fall Risk Screening, Immunizations)    Hemoglobin A1C (%)   Date Value   04/13/2021 5.2   02/19/2021 5.1   08/13/2020 5.2     LDL Cholesterol (mg/dL)   Date Value   08/28/2014 81     LDL Calculated (mg/dL)   Date Value   08/09/2021 103     AST (U/L)   Date Value   08/09/2021 21     ALT (U/L)   Date Value   08/09/2021 24     BUN (mg/dL)   Date Value   04/13/2021 16      (goal A1C is < 7)   (goal LDL is <100) need 30-50% reduction from baseline     BP Readings from Last 3 Encounters:   08/10/21 122/86   04/19/21 122/88   09/18/20 124/80    (goal /80)      All Future Testing planned in [facility]:  Lab Frequency Next Occurrence       Next Visit Date:  Future Appointments   Date Time Provider Jeovany Mcmahon   11/10/2021  3:40 PM KAREN Silvestre - CNP [facility]            Patient Active Problem List:     Hyperlipidemia     Insomnia     Essential hypertension     Hypothyroidism     Migraine headache     Chronic cough     Anxiety     DANNY (obstructive sleep apnea)     Positive depression screening     Episode of recurrent major depressive disorder ([facility]     Depression, major, in remission ([facility]     Hyperglycemia     Vitamin D deficiency     Fibromyalgia     Other chronic pain

## 2021-09-27 RX ORDER — FENOFIBRATE 48 MG/1
TABLET, COATED ORAL
Qty: 90 TABLET | Refills: 1 | Status: SHIPPED | OUTPATIENT
Start: 2021-09-27 | End: 2022-04-01

## 2021-11-10 ENCOUNTER — OFFICE VISIT (OUTPATIENT)
Dept: FAMILY MEDICINE CLINIC | Age: 51
End: 2021-11-10
Payer: COMMERCIAL

## 2021-11-10 VITALS
BODY MASS INDEX: 29.35 KG/M2 | HEART RATE: 81 BPM | RESPIRATION RATE: 16 BRPM | HEIGHT: 67 IN | DIASTOLIC BLOOD PRESSURE: 80 MMHG | SYSTOLIC BLOOD PRESSURE: 110 MMHG | OXYGEN SATURATION: 98 % | TEMPERATURE: 97.3 F | WEIGHT: 187 LBS

## 2021-11-10 DIAGNOSIS — J30.9 ALLERGIC RHINITIS, UNSPECIFIED SEASONALITY, UNSPECIFIED TRIGGER: ICD-10-CM

## 2021-11-10 DIAGNOSIS — I10 ESSENTIAL HYPERTENSION: Primary | ICD-10-CM

## 2021-11-10 DIAGNOSIS — E78.5 HYPERLIPIDEMIA, UNSPECIFIED HYPERLIPIDEMIA TYPE: ICD-10-CM

## 2021-11-10 DIAGNOSIS — E03.8 OTHER SPECIFIED HYPOTHYROIDISM: ICD-10-CM

## 2021-11-10 DIAGNOSIS — R73.9 HYPERGLYCEMIA: ICD-10-CM

## 2021-11-10 PROCEDURE — 99214 OFFICE O/P EST MOD 30 MIN: CPT | Performed by: NURSE PRACTITIONER

## 2021-11-10 RX ORDER — FLUTICASONE PROPIONATE 50 MCG
2 SPRAY, SUSPENSION (ML) NASAL DAILY
Qty: 16 G | Refills: 2 | Status: SHIPPED | OUTPATIENT
Start: 2021-11-10 | End: 2021-12-02

## 2021-11-10 ASSESSMENT — ENCOUNTER SYMPTOMS: SHORTNESS OF BREATH: 0

## 2021-11-10 NOTE — PATIENT INSTRUCTIONS
SURVEY:    You may be receiving a survey from [employer] regarding your visit today. Please complete the survey to enable us to provide the highest quality of care to you and your family. If you cannot score us a very good on any question, please call the office to discuss how we could of made your experience a very good one. Thank you.

## 2021-11-10 NOTE — PROGRESS NOTES
Name: Ayde Dwyer  : 1970         Chief Complaint:     Chief Complaint   Patient presents with    Follow-up     patient states she is doing well. denies concerns. states she did not decrease her mirtazipine. still going to counsiling    Head Congestion     head fullness. denies pain. start of sinus pressure. started 1 day ago       History of Present Illness:      Ayde Dwyer is a 46 y.o.  female who presents with Follow-up (patient states she is doing well. denies concerns. states she did not decrease her mirtazipine. still going to counsiling) and Head Congestion (head fullness. denies pain. start of sinus pressure. started 1 day ago)      HPI     Hypertension:  Current treatment includes losartan 50mg and HCTZ 12.5mg. She is not monitoring blood pressure at home. She admits low salt diet. Denies chest pain, SOB, vision changes, headache, palpitations, lightheadedness, or dizziness. Depression/Anxiety:  Current treatment includes Cymbalta 30 mg daily, zoloft 100mg, and mirtazapine 30 mg daily. She takes xanax for anxiety attacks. She states she has not taken xanax for the last 3 weeks. She is enrolled in counseling. 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 She enjoys the stress relief with grad school as it gives her purpose and a project to work towards. Admits stress related to poor health of mother. Hyperlipidemia:  Current treatment includes fenofibrate 48mg and simvastatin 40mg. Denies side effects. She is avoiding high cholesterol foods.     Past Medical History:     Past Medical History:   Diagnosis Date    Depression     Headache     Hyperlipidemia     Hypertension     Hypothyroidism     Insomnia     Irritable bowel syndrome       Reviewed all health maintenance requirements and ordered appropriate tests  Health Maintenance Due   Topic Date Due    Cervical cancer screen  2018       Past Surgical History:     Past Surgical History:   Procedure Laterality Date    APPENDECTOMY  12    BACK SURGERY  1988    L1 or L2 shattered and repaired    COLONOSCOPY  2004/2005    COLPOSCOPY  03/30/2011    C1N1        Medications:       Prior to Admission medications    Medication Sig Start Date End Date Taking?  Authorizing Provider   fluticasone (FLONASE) 50 MCG/ACT nasal spray 2 sprays by Each Nostril route daily 11/10/21  Yes KAREN Velasquez CNP   vitamin B-12 (CYANOCOBALAMIN) 500 MCG tablet TAKE 1 TABLET BY MOUTH EVERY DAY 10/25/21  Yes Jules Saleem MD   fenofibrate (TRICOR) 48 MG tablet TAKE 1 TABLET BY MOUTH EVERY DAY 9/27/21  Yes KAREN Velasquez CNP   butalbital-acetaminophen-caffeine (FIORICET, ESGIC) -40 MG per tablet TAKE 1 TABLET BY MOUTH EVERY 4 HOURS AS NEEDED DO NOT EXCEED 6 TABLETS DAILY 9/15/21  Yes KAREN Velasquez CNP   sertraline (ZOLOFT) 100 MG tablet Take 1 tablet by mouth daily 8/18/21  Yes Jules Saleem MD   losartan (COZAAR) 50 MG tablet TAKE 1 TABLET BY MOUTH EVERY DAY 7/19/21  Yes Jules Saleem MD   mirtazapine (REMERON) 30 MG tablet TAKE 1 TABLET BY MOUTH EVERY DAY AT NIGHT 5/25/21  Yes KAREN Velasquez CNP   hydroCHLOROthiazide (HYDRODIURIL) 12.5 MG tablet TAKE 1 TABLET BY MOUTH EVERY DAY 5/25/21  Yes Jules Saleem MD   levothyroxine (SYNTHROID) 100 MCG tablet TAKE 1 TABLET BY MOUTH EVERY DAY 5/25/21  Yes Jules Saleem MD   ibuprofen (ADVIL;MOTRIN) 800 MG tablet TAKE 1 TABLET EVERY 8 HOURS AS NEEDED 4/19/21  Yes KAREN Velasquez CNP   Blood Pressure Monitoring (BLOOD PRESSURE CUFF) MISC 1 kit by Does not apply route daily 4/19/21  Yes KAREN Velasquez CNP   simvastatin (ZOCOR) 40 MG tablet TAKE 1 TABLET BY MOUTH DAILY IN THE EVENING 12/17/20  Yes Jules Saleem MD   DULoxetine (CYMBALTA) 30 MG extended release capsule TAKE 1 CAPSULE BY MOUTH EVERY DAY 12/10/20  Yes Jules Saleem MD   Cholecalciferol (VITAMIN D3) 25 MCG (1000 UT) TABS Take 2 tablets by mouth daily 2/7/20  Yes [place] MD Boo   Cyanocobalamin (VITAMIN B 12 PO) Take by mouth   Yes Historical Provider, MD   Na Sulfate-K Sulfate-Mg Sulf (SUPREP BOWEL PREP KIT) 17.5-3.13-1.6 GM/177ML SOLN Take as directed  Patient not taking: Reported on 9/18/2020 8/26/20   Glenny Lunsford MD        Allergies:       Patient has no known allergies. Social History:     Tobacco:    reports that she has never smoked. She has never used smokeless tobacco.  Alcohol:      reports no history of alcohol use. Drug Use:  has no history on file for drug use. Family History:     Family History   Problem Relation Age of Onset    Cancer Maternal Aunt         breast       Review of Systems:     Positive and Negative as described in HPI    Review of Systems   Eyes: Negative for visual disturbance. Respiratory: Negative for shortness of breath. Cardiovascular: Negative for chest pain and palpitations. Neurological: Negative for dizziness, light-headedness and headaches. Psychiatric/Behavioral: The patient is nervous/anxious. Physical Exam:   Vitals:  /80   Pulse 81   Temp 97.3 °F (36.3 °C)   Resp 16   Ht 5' 7\" (1.702 m)   Wt 187 lb (84.8 kg)   SpO2 98%   BMI 29.29 kg/m²     Physical Exam  Constitutional:       General: She is not in acute distress. Appearance: Normal appearance. She is normal weight. She is not ill-appearing or toxic-appearing. HENT:      Head: Normocephalic. Right Ear: Tympanic membrane, ear canal and external ear normal. There is no impacted cerumen. Left Ear: Ear canal and external ear normal. There is no impacted cerumen. Ears:      Comments: Left TM with mild effusion      Nose: Rhinorrhea present. No congestion. Mouth/Throat:      Mouth: Mucous membranes are moist.      Comments: +PND  Cardiovascular:      Rate and Rhythm: Normal rate and regular rhythm. Heart sounds: Normal heart sounds. No murmur heard.       Pulmonary:      Effort: Pulmonary effort is normal. No respiratory distress. Breath sounds: Normal breath sounds. No stridor. No wheezing, rhonchi or rales. Musculoskeletal:      Cervical back: Neck supple. Lymphadenopathy:      Cervical: No cervical adenopathy. Neurological:      Mental Status: She is alert and oriented to person, place, and time. Psychiatric:         Mood and Affect: Mood normal.         Behavior: Behavior normal.         Thought Content: Thought content normal.         Judgment: Judgment normal.         Data:     Lab Results   Component Value Date     04/13/2021    K 4.0 04/13/2021     04/13/2021    CO2 27 04/13/2021    BUN 16 04/13/2021    CREATININE 0.86 04/13/2021    GLUCOSE 83 04/13/2021    PROT 7.3 04/13/2021    LABALBU 3.9 04/13/2021    BILITOT 0.4 04/13/2021    ALKPHOS 92 04/13/2021    AST 21 08/09/2021    ALT 24 08/09/2021     Lab Results   Component Value Date    WBC 5.9 04/13/2021    RBC 4.62 04/13/2021    HGB 15.1 04/13/2021    HCT 42.8 04/13/2021    MCV 93 04/13/2021    MCH 32.7 04/13/2021    MCHC 35.3 04/13/2021    RDW 12.6 04/13/2021     04/13/2021    MPV 9.5 04/13/2021     Lab Results   Component Value Date    TSH 3.07 04/13/2021     Lab Results   Component Value Date    CHOL 211 08/09/2021    CHOL 188 07/01/2015    HDL 60 08/09/2021    LABA1C 5.2 04/13/2021       Assessment/Plan:      Diagnosis Orders   1. Essential hypertension  ALT    AST    Basic Metabolic Panel    CBC   2. Hyperlipidemia, unspecified hyperlipidemia type  Lipid Panel   3. Other specified hypothyroidism  TSH without Reflex    T4, Free   4. Hyperglycemia  Hemoglobin A1C   5. Allergic rhinitis, unspecified seasonality, unspecified trigger         Allergic rhinitis:  -Initiate Flonase  -Notify office if symptoms worsen or persist    Anxiety/depression:  -Continue counseling  -Continue all medications as prescribed. No changes today. Hypertension:  -Stable. No changes in medication today. Overall, I believe the patient is doing well.   She will follow-up back in office 2022 prior to leaving the state to take care of her mother. Completed Refills   Requested Prescriptions     Signed Prescriptions Disp Refills    fluticasone (FLONASE) 50 MCG/ACT nasal spray 16 g 2     Si sprays by Each Nostril route daily       Orders Placed This Encounter   Procedures    ALT     Standing Status:   Future     Standing Expiration Date:   11/10/2022    AST     Standing Status:   Future     Standing Expiration Date:   11/10/2022    Basic Metabolic Panel     Standing Status:   Future     Standing Expiration Date:   11/10/2022    CBC     Standing Status:   Future     Standing Expiration Date:   11/10/2022    Hemoglobin A1C     Standing Status:   Future     Standing Expiration Date:   11/10/2022    Lipid Panel     Standing Status:   Future     Standing Expiration Date:   11/10/2022     Order Specific Question:   Is Patient Fasting?/# of Hours     Answer:   fasting    TSH without Reflex     Standing Status:   Future     Standing Expiration Date:   11/10/2022    T4, Free     Standing Status:   Future     Standing Expiration Date:   11/10/2022        No results found for this visit on 11/10/21. Return if symptoms worsen or fail to improve, for f/u labs prior.     Electronically signed by KAREN Gaston CNP on 11/10/21 at 4:41 PM.

## 2021-11-29 DIAGNOSIS — F32.4 MAJOR DEPRESSIVE DISORDER IN PARTIAL REMISSION, UNSPECIFIED WHETHER RECURRENT (HCC): ICD-10-CM

## 2021-11-29 DIAGNOSIS — F33.9 EPISODE OF RECURRENT MAJOR DEPRESSIVE DISORDER, UNSPECIFIED DEPRESSION EPISODE SEVERITY (HCC): ICD-10-CM

## 2021-11-29 RX ORDER — SERTRALINE HYDROCHLORIDE 100 MG/1
TABLET, FILM COATED ORAL
Qty: 90 TABLET | Refills: 1 | Status: SHIPPED | OUTPATIENT
Start: 2021-11-29 | End: 2022-08-11

## 2021-11-29 RX ORDER — DULOXETIN HYDROCHLORIDE 30 MG/1
CAPSULE, DELAYED RELEASE ORAL
Qty: 90 CAPSULE | Refills: 3 | Status: SHIPPED | OUTPATIENT
Start: 2021-11-29

## 2021-12-02 RX ORDER — FLUTICASONE PROPIONATE 50 MCG
SPRAY, SUSPENSION (ML) NASAL
Qty: 16 G | Refills: 2 | Status: SHIPPED | OUTPATIENT
Start: 2021-12-02

## 2021-12-09 RX ORDER — SIMVASTATIN 40 MG
TABLET ORAL
Qty: 90 TABLET | Refills: 3 | Status: SHIPPED | OUTPATIENT
Start: 2021-12-09

## 2021-12-15 DIAGNOSIS — F41.9 ANXIETY: ICD-10-CM

## 2021-12-15 RX ORDER — ALPRAZOLAM 0.25 MG/1
TABLET ORAL
Qty: 30 TABLET | Refills: 0 | Status: SHIPPED | OUTPATIENT
Start: 2021-12-15 | End: 2022-03-01 | Stop reason: SDUPTHER

## 2022-01-04 RX ORDER — MIRTAZAPINE 30 MG/1
TABLET, FILM COATED ORAL
Qty: 90 TABLET | Refills: 1 | Status: SHIPPED | OUTPATIENT
Start: 2022-01-04 | End: 2022-06-29

## 2022-02-28 DIAGNOSIS — F41.9 ANXIETY: ICD-10-CM

## 2022-02-28 RX ORDER — ALPRAZOLAM 0.25 MG/1
TABLET ORAL
Qty: 30 TABLET | Refills: 0 | OUTPATIENT
Start: 2022-02-28

## 2022-03-01 DIAGNOSIS — F41.9 ANXIETY: ICD-10-CM

## 2022-03-01 RX ORDER — ALPRAZOLAM 0.25 MG/1
TABLET ORAL
Qty: 30 TABLET | Refills: 0 | Status: SHIPPED | OUTPATIENT
Start: 2022-03-01 | End: 2022-05-17 | Stop reason: SDUPTHER

## 2022-04-01 RX ORDER — FENOFIBRATE 48 MG/1
TABLET, COATED ORAL
Qty: 90 TABLET | Refills: 1 | Status: SHIPPED | OUTPATIENT
Start: 2022-04-01 | End: 2022-04-11 | Stop reason: ALTCHOICE

## 2022-04-07 LAB
ABSOLUTE BASO #: 0.1 X10E9/L (ref 0–0.2)
ABSOLUTE EOS #: 0.3 X10E9/L (ref 0–0.4)
ABSOLUTE LYMPH #: 1.8 X10E9/L (ref 1–3.5)
ABSOLUTE MONO #: 0.4 X10E9/L (ref 0–0.9)
ABSOLUTE NEUT #: 3 X10E9/L (ref 1.5–6.6)
ALT SERPL-CCNC: 27 U/L (ref 0–31)
ANION GAP SERPL CALCULATED.3IONS-SCNC: 11 MMOL/L (ref 5–15)
AST SERPL-CCNC: 22 U/L (ref 0–41)
AVERAGE GLUCOSE: 111 MG/DL
BASOPHILS RELATIVE PERCENT: 1 %
BUN BLDV-MCNC: 19 MG/DL (ref 5–23)
CALCIUM SERPL-MCNC: 9.6 MG/DL (ref 8.5–10.5)
CHLORIDE BLD-SCNC: 104 MMOL/L (ref 98–109)
CHOLESTEROL/HDL RATIO: 3.2 (ref 1–5)
CHOLESTEROL: 195 MG/DL (ref 150–200)
CO2: 27 MMOL/L (ref 22–32)
CREAT SERPL-MCNC: 0.96 MG/DL (ref 0.4–1)
EGFR AFRICAN AMERICAN: >60 ML/MIN/1.73SQ.M
EGFR IF NONAFRICAN AMERICAN: >60 ML/MIN/1.73SQ.M
EOSINOPHILS RELATIVE PERCENT: 5.2 %
GLUCOSE: 92 MG/DL (ref 65–99)
HBA1C MFR BLD: 5.5 % (ref 4.4–6.4)
HCT VFR BLD CALC: 38.7 % (ref 35–47)
HDLC SERPL-MCNC: 61 MG/DL
HEMOGLOBIN: 13.4 G/DL (ref 11.7–15.5)
LDL CHOLESTEROL CALCULATED: 85 MG/DL
LDL/HDL RATIO: 1.4
LYMPHOCYTE %: 32.7 %
MCH RBC QN AUTO: 32 PG (ref 27–34)
MCHC RBC AUTO-ENTMCNC: 34.5 G/DL (ref 32–36)
MCV RBC AUTO: 93 FL (ref 80–100)
MONOCYTES # BLD: 6.9 %
NEUTROPHILS RELATIVE PERCENT: 54.2 %
PDW BLD-RTO: 13 % (ref 11.5–15)
PLATELETS: 296 X10E9/L (ref 150–450)
PMV BLD AUTO: 9.2 FL (ref 7–12)
POTASSIUM SERPL-SCNC: 4 MMOL/L (ref 3.5–5)
RBC: 4.18 X10E12/L (ref 3.8–5.2)
SODIUM BLD-SCNC: 142 MMOL/L (ref 134–146)
T4 FREE: 0.92 NG/DL (ref 0.61–1.6)
TRIGL SERPL-MCNC: 247 MG/DL (ref 27–150)
TSH SERPL DL<=0.05 MIU/L-ACNC: 2.95 UIU/ML (ref 0.49–4.67)
VLDLC SERPL CALC-MCNC: 49 MG/DL (ref 0–30)
WBC: 5.6 X10E9/L (ref 4–11)

## 2022-04-11 ENCOUNTER — OFFICE VISIT (OUTPATIENT)
Dept: FAMILY MEDICINE CLINIC | Age: 52
End: 2022-04-11
Payer: COMMERCIAL

## 2022-04-11 VITALS
SYSTOLIC BLOOD PRESSURE: 120 MMHG | DIASTOLIC BLOOD PRESSURE: 86 MMHG | OXYGEN SATURATION: 100 % | BODY MASS INDEX: 29.51 KG/M2 | WEIGHT: 188 LBS | HEART RATE: 101 BPM | HEIGHT: 67 IN | RESPIRATION RATE: 16 BRPM

## 2022-04-11 DIAGNOSIS — F41.9 ANXIETY: ICD-10-CM

## 2022-04-11 DIAGNOSIS — E78.5 HYPERLIPIDEMIA, UNSPECIFIED HYPERLIPIDEMIA TYPE: ICD-10-CM

## 2022-04-11 DIAGNOSIS — R73.9 HYPERGLYCEMIA: ICD-10-CM

## 2022-04-11 DIAGNOSIS — I10 ESSENTIAL HYPERTENSION: Primary | ICD-10-CM

## 2022-04-11 PROCEDURE — 99214 OFFICE O/P EST MOD 30 MIN: CPT | Performed by: NURSE PRACTITIONER

## 2022-04-11 RX ORDER — FENOFIBRATE 54 MG/1
54 TABLET ORAL DAILY
Qty: 30 TABLET | Refills: 5 | Status: SHIPPED | OUTPATIENT
Start: 2022-04-11 | End: 2022-05-09

## 2022-04-11 ASSESSMENT — PATIENT HEALTH QUESTIONNAIRE - PHQ9
8. MOVING OR SPEAKING SO SLOWLY THAT OTHER PEOPLE COULD HAVE NOTICED. OR THE OPPOSITE, BEING SO FIGETY OR RESTLESS THAT YOU HAVE BEEN MOVING AROUND A LOT MORE THAN USUAL: 0
9. THOUGHTS THAT YOU WOULD BE BETTER OFF DEAD, OR OF HURTING YOURSELF: 0
3. TROUBLE FALLING OR STAYING ASLEEP: 0
SUM OF ALL RESPONSES TO PHQ QUESTIONS 1-9: 0
4. FEELING TIRED OR HAVING LITTLE ENERGY: 0
SUM OF ALL RESPONSES TO PHQ QUESTIONS 1-9: 0
7. TROUBLE CONCENTRATING ON THINGS, SUCH AS READING THE NEWSPAPER OR WATCHING TELEVISION: 0
6. FEELING BAD ABOUT YOURSELF - OR THAT YOU ARE A FAILURE OR HAVE LET YOURSELF OR YOUR FAMILY DOWN: 0
SUM OF ALL RESPONSES TO PHQ QUESTIONS 1-9: 0
1. LITTLE INTEREST OR PLEASURE IN DOING THINGS: 0
10. IF YOU CHECKED OFF ANY PROBLEMS, HOW DIFFICULT HAVE THESE PROBLEMS MADE IT FOR YOU TO DO YOUR WORK, TAKE CARE OF THINGS AT HOME, OR GET ALONG WITH OTHER PEOPLE: 0
2. FEELING DOWN, DEPRESSED OR HOPELESS: 0
SUM OF ALL RESPONSES TO PHQ QUESTIONS 1-9: 0
5. POOR APPETITE OR OVEREATING: 0
SUM OF ALL RESPONSES TO PHQ9 QUESTIONS 1 & 2: 0

## 2022-04-11 ASSESSMENT — ENCOUNTER SYMPTOMS
CONSTIPATION: 1
ABDOMINAL PAIN: 1
ABDOMINAL DISTENTION: 1

## 2022-04-11 NOTE — PATIENT INSTRUCTIONS
SURVEY:    You may be receiving a survey from [employer] regarding your visit today. Please complete the survey to enable us to provide the highest quality of care to you and your family. If you cannot score us a very good on any question, please call the office to discuss how we could of made your experience a very good one. Thank you. Patient Education        Learning About Foods That Are Good Sources of Fiber  What foods are high in fiber? The foods you eat contain nutrients, such as vitamins and minerals. Fiber is a nutrient. Your body needs the right amount to stay healthy and work as it should. You can use the list below to help you make choices about which foodsto eat. Here are some examples of foods that are good sources of fiber. Fruits   Apple   Apricot   Avocado   Banana   Blackberries   Cherries   Melon   Pear   Raspberries  Grains   Amaranth   Barley   Bran cereal   Farro   Oat bran   Oatmeal   Quinoa   Rice (brown or wild)   Whole-grain bread   Whole-grain English muffin  Protein foods   Almonds   Beans (black, kidney, navy, jeong)   Kelton seeds   Garbanzo beans   Lentils   Pumpkin seeds   Split peas   Sunflower seeds  Vegetables   Artichoke   Broccoli   Oceano sprouts   Cabbage   Carrots   Cauliflower   Eggplant   Green peas   [address]   Sweet potato   White potato  Work with your doctor to find out how much of this nutrient you need. Dependingon your health, you may need more or less of it in your diet. Where can you learn more? Go to https://Luminatekathy.healthFeed.fm. org and sign in to your [employer] account. Enter F355 in the [facility] box to learn more about \"Learning About Foods That Are Good Sources of Fiber. \"     If you do not have an account, please click on the \"Sign Up Now\" link. Current as of: September 8, 2021               Content Version: 13.2  © 5206-8163 Healthwise, Incorporated.    Care instructions adapted under license by [facility] ([facility]). If you have questions about a medical condition or this instruction, always ask your healthcare professional. [address] any warranty or liability for your use of this information. Patient Education        High-Fiber Diet: Care Instructions  Overview     A high-fiber diet may help you relieve constipation and feel less bloated. Your doctor and dietitian will help you make a high-fiber eating plan based on your personal needs. The plan will include the things you like to eat. It willalso make sure that you get 25 to 35 grams of fiber a day. Before you make changes to the way you eat, be sure to talk with your doctor ordietitian. Follow-up care is a key part of your treatment and safety. Be sure to make and go to all appointments, and call your doctor if you are having problems. It's also a good idea to know your test results and keep alist of the medicines you take. How can you care for yourself at home?  You can increase how much fiber you get if you eat more of certain foods. These foods include:  ? Whole-grain breads and cereals. ? Fruits, such as pears, apples, and peaches. Eat the skins and peels if you can.  ? Vegetables, such as broccoli, cabbage, spinach, carrots, asparagus, and squash. ? Starchy vegetables. These include potatoes with skins, kidney beans, and lima beans.  Take a fiber supplement every day if your doctor recommends it. Examples are Benefiber, Citrucel, FiberCon, and Metamucil. Ask your doctor how much to take.  Drink plenty of fluids. If you have kidney, heart, or liver disease and have to limit fluids, talk with your doctor before you increase the amount of fluids you drink. Where can you learn more? Go to https://chpepiceweb.Digitel. org and sign in to your [employer] account. Enter G128 in the [facility] box to learn more about \"High-Fiber Diet: Care Instructions. \"     If you do not have an account, please click on the \"Sign Up Now\" link. Current as of: September 8, 2021               Content Version: 13.2  © 2006-2022 Healthwise, Incorporated. Care instructions adapted under license by [facility] ([facility]). If you have questions about a medical condition or this instruction, always ask your healthcare professional. [address] any warranty or liability for your use of this information.

## 2022-04-11 NOTE — PROGRESS NOTES
Name: Karey Rodriguez  : 1970         Chief Complaint:     Chief Complaint   Patient presents with    Follow-up     6 month check. due for pap. notified. denies any concerns. History of Present Illness:      Karey Rodriguez is a 46 y.o.  female who presents with Follow-up (6 month check. due for pap. notified. denies any concerns. )      HPI    The patient presents for 6 month follow up. Hyperlipidemia:  Current treatment includes fenofibrate 48mg QD and simvastatin 40mg. Lipid panel 2022 triglycerides 247, total cholesterol 195, LDL 85. Admits following a low cholesterol diet. Hypertension:  Current treatment includes hydrochlorothiazide 12.5 mg daily and losartan 50 mg daily. She denies monitoring blood pressure at home. This has been averaging N/A. She denies low salt diet. Physical activity includes none. Constipation:  Admits constipation x3 months. She has trialed OTC fiber supplements and stool softeners. Admits poor water intake. For physical activity she notes none. She admits high fiber diet when she is cooking for herself. She has trialed miralax in the past with minimal benefit. She last used this 1 month ago. Miralax \"lessens the strength of her depression medications\". She admits abdominal bloating. She is having single BM once daily. She admits straining with these.      Past Medical History:     Past Medical History:   Diagnosis Date    Depression     Headache     Hyperlipidemia     Hypertension     Hypothyroidism     Insomnia     Irritable bowel syndrome       Reviewed all health maintenance requirements and ordered appropriate tests  Health Maintenance Due   Topic Date Due    Cervical cancer screen  2018       Past Surgical History:     Past Surgical History:   Procedure Laterality Date    APPENDECTOMY      BACK SURGERY      L1 or L2 shattered and repaired    COLONOSCOPY      COLPOSCOPY  2011    C1N1        Medications: Prior to Admission medications    Medication Sig Start Date End Date Taking?  Authorizing Provider   fenofibrate (TRICOR) 54 MG tablet Take 1 tablet by mouth daily 4/11/22  Yes KAREN Rodriguez CNP   mirtazapine (REMERON) 30 MG tablet TAKE 1 TABLET BY MOUTH EVERY DAY AT NIGHT 1/4/22  Yes KAREN Rodriguez CNP   simvastatin (ZOCOR) 40 MG tablet TAKE 1 TABLET BY MOUTH DAILY IN THE EVENING 12/9/21  Yes Rocky Hernández MD   fluticasone (FLONASE) 50 MCG/ACT nasal spray SPRAY 2 SPRAYS INTO EACH NOSTRIL EVERY DAY 12/2/21  Yes KAREN Rodriguez CNP   DULoxetine (CYMBALTA) 30 MG extended release capsule TAKE 1 CAPSULE BY MOUTH EVERY DAY 11/29/21  Yes Rocky Hernández MD   sertraline (ZOLOFT) 100 MG tablet TAKE 1 TABLET BY MOUTH EVERY DAY 11/29/21  Yes Rocky Hernández MD   vitamin B-12 (CYANOCOBALAMIN) 500 MCG tablet TAKE 1 TABLET BY MOUTH EVERY DAY 10/25/21  Yes Rocky Hernández MD   butalbital-acetaminophen-caffeine (FIORICET, ESGIC) -40 MG per tablet TAKE 1 TABLET BY MOUTH EVERY 4 HOURS AS NEEDED DO NOT EXCEED 6 TABLETS DAILY 9/15/21  Yes KAREN Rodriguez CNP   losartan (COZAAR) 50 MG tablet TAKE 1 TABLET BY MOUTH EVERY DAY 7/19/21  Yes Rocky Hernández MD   hydroCHLOROthiazide (HYDRODIURIL) 12.5 MG tablet TAKE 1 TABLET BY MOUTH EVERY DAY 5/25/21  Yes Rocky Hernández MD   levothyroxine (SYNTHROID) 100 MCG tablet TAKE 1 TABLET BY MOUTH EVERY DAY 5/25/21  Yes Rocky Hernández MD   ibuprofen (ADVIL;MOTRIN) 800 MG tablet TAKE 1 TABLET EVERY 8 HOURS AS NEEDED 4/19/21  Yes KAREN Rodriguez CNP   Blood Pressure Monitoring (BLOOD PRESSURE CUFF) MISC 1 kit by Does not apply route daily 4/19/21  Yes KAREN Rodriguez CNP   Na Sulfate-K Sulfate-Mg Sulf (SUPREP BOWEL PREP KIT) 17.5-3.13-1.6 GM/177ML SOLN Take as directed 8/26/20  Yes Nadja Ulrich MD   Cholecalciferol (VITAMIN D3) 25 MCG (1000 UT) TABS Take 2 tablets by mouth daily 2/7/20  Yes Rocky Hernández MD   Cyanocobalamin (VITAMIN B 12 PO) Take by mouth   Yes Historical Provider, MD        Allergies:       Patient has no known allergies. Social History:     Tobacco:    reports that she has never smoked. She has never used smokeless tobacco.  Alcohol:      reports no history of alcohol use. Drug Use:  has no history on file for drug use. Family History:     Family History   Problem Relation Age of Onset    Cancer Maternal Aunt         breast       Review of Systems:     Positive and Negative as described in HPI    Review of Systems   Gastrointestinal: Positive for abdominal distention, abdominal pain and constipation. Physical Exam:   Vitals:  /86   Pulse 101   Resp 16   Ht 5' 7\" (1.702 m)   Wt 188 lb (85.3 kg)   SpO2 100%   BMI 29.44 kg/m²     Physical Exam  Constitutional:       General: She is not in acute distress. Appearance: Normal appearance. She is obese. She is not ill-appearing or toxic-appearing. HENT:      Head: Normocephalic. Cardiovascular:      Rate and Rhythm: Regular rhythm. Tachycardia present. Heart sounds: Normal heart sounds. No murmur heard. Pulmonary:      Effort: Pulmonary effort is normal. No respiratory distress. Breath sounds: Normal breath sounds. No stridor. No wheezing, rhonchi or rales. Abdominal:      General: There is no distension. Palpations: Abdomen is soft. There is no mass. Tenderness: There is no abdominal tenderness. There is no guarding. Hernia: No hernia is present. Comments: Hypoactive bowel sounds in all 4 quadrants   Neurological:      Mental Status: She is alert. Psychiatric:         Mood and Affect: Mood normal.         Behavior: Behavior normal.         Thought Content:  Thought content normal.         Judgment: Judgment normal.         Data:     Lab Results   Component Value Date     04/07/2022    K 4.0 04/07/2022     04/07/2022    CO2 27 04/07/2022    BUN 19 04/07/2022    CREATININE 0.96 04/07/2022    GLUCOSE 92 04/07/2022    PROT 7.3 04/13/2021    LABALBU 3.9 04/13/2021    BILITOT 0.4 04/13/2021    ALKPHOS 92 04/13/2021    AST 22 04/07/2022    ALT 27 04/07/2022     Lab Results   Component Value Date    WBC 5.6 04/07/2022    RBC 4.18 04/07/2022    HGB 13.4 04/07/2022    HCT 38.7 04/07/2022    MCV 93 04/07/2022    MCH 32.0 04/07/2022    MCHC 34.5 04/07/2022    RDW 13.0 04/07/2022     04/07/2022    MPV 9.2 04/07/2022     Lab Results   Component Value Date    TSH 2.95 04/07/2022     Lab Results   Component Value Date    CHOL 195 04/07/2022    CHOL 188 07/01/2015    HDL 61 04/07/2022    LABA1C 5.5 04/07/2022       Assessment/Plan:      Diagnosis Orders   1. Essential hypertension  ALT    AST    Basic Metabolic Panel    CBC   2. Hyperlipidemia, unspecified hyperlipidemia type  Lipid Panel   3. Hyperglycemia  Hemoglobin A1C   4. Anxiety  TSH With Reflex Ft4     Hypertension:  -Stable, continue all medications as prescribed  -Reviewed lifestyle modifications to assist with lowering blood pressure including weight loss, healthy diet, exercising routinely, low-sodium diet, limiting caffeine and alcohol, and encouraging stress relief techniques. Constipation:  -Encouraged high-fiber diet, printed information for high-fiber diet supplied to patient today  -Encouraged increased water intake  -Encourage routine exercise  -Trial Colace OTC  -Hold off on MiraLAX at this time as she has had side effects with this in the past    Hyperlipidemia:  -Reviewed lab work from 4/2022. Triglycerides 247, LDL 85.  -Continue simvastatin 40 mg daily  -Increased fenofibrate from 48 mg to 54 mg  -Counseled on low-cholesterol diet  -Encouraged routine physical activity 5 days weekly for 30 minutes    The patient is planning to leave for [place] 5/2022 and will not be returning to [place] until 1/2023. We will schedule repeat labs and office visit at this time.     Completed Refills   Requested Prescriptions     Signed Prescriptions Disp Refills    fenofibrate (TRICOR) 54 MG tablet 30 tablet 5     Sig: Take 1 tablet by mouth daily       Orders Placed This Encounter   Procedures    ALT     Standing Status:   Future     Standing Expiration Date:   4/11/2023    AST     Standing Status:   Future     Standing Expiration Date:   4/11/2023    Basic Metabolic Panel     Standing Status:   Future     Standing Expiration Date:   4/11/2023    CBC     Standing Status:   Future     Standing Expiration Date:   4/11/2023    Hemoglobin A1C     Standing Status:   Future     Standing Expiration Date:   4/11/2023    Lipid Panel     Standing Status:   Future     Standing Expiration Date:   4/11/2023     Order Specific Question:   Is Patient Fasting?/# of Hours     Answer:   fasting    TSH With Reflex Ft4     Standing Status:   Future     Standing Expiration Date:   4/11/2023        No results found for this visit on 04/11/22. Return if symptoms worsen or fail to improve.     Electronically signed by KAREN Turner CNP on 04/11/22 at 4:47 PM.

## 2022-05-09 RX ORDER — FENOFIBRATE 54 MG/1
TABLET ORAL
Qty: 90 TABLET | Refills: 3 | Status: SHIPPED | OUTPATIENT
Start: 2022-05-09

## 2022-05-12 ENCOUNTER — TELEPHONE (OUTPATIENT)
Dept: FAMILY MEDICINE CLINIC | Age: 52
End: 2022-05-12

## 2022-05-12 ENCOUNTER — APPOINTMENT (OUTPATIENT)
Dept: GENERAL RADIOLOGY | Age: 52
End: 2022-05-12
Payer: COMMERCIAL

## 2022-05-12 ENCOUNTER — HOSPITAL ENCOUNTER (EMERGENCY)
Age: 52
Discharge: HOME | End: 2022-05-12
Attending: EMERGENCY MEDICINE
Payer: COMMERCIAL

## 2022-05-12 VITALS
WEIGHT: 185 LBS | HEART RATE: 82 BPM | RESPIRATION RATE: 18 BRPM | DIASTOLIC BLOOD PRESSURE: 93 MMHG | TEMPERATURE: 98.5 F | OXYGEN SATURATION: 98 % | SYSTOLIC BLOOD PRESSURE: 131 MMHG | BODY MASS INDEX: 28.98 KG/M2

## 2022-05-12 DIAGNOSIS — S39.012A STRAIN OF LUMBAR REGION, INITIAL ENCOUNTER: Primary | ICD-10-CM

## 2022-05-12 PROCEDURE — 96374 THER/PROPH/DIAG INJ IV PUSH: CPT

## 2022-05-12 PROCEDURE — 72100 X-RAY EXAM L-S SPINE 2/3 VWS: CPT

## 2022-05-12 PROCEDURE — 2580000003 HC RX 258: Performed by: EMERGENCY MEDICINE

## 2022-05-12 PROCEDURE — 96375 TX/PRO/DX INJ NEW DRUG ADDON: CPT

## 2022-05-12 PROCEDURE — 6360000002 HC RX W HCPCS: Performed by: EMERGENCY MEDICINE

## 2022-05-12 PROCEDURE — 99284 EMERGENCY DEPT VISIT MOD MDM: CPT

## 2022-05-12 PROCEDURE — 73502 X-RAY EXAM HIP UNI 2-3 VIEWS: CPT

## 2022-05-12 RX ORDER — HYDROCODONE BITARTRATE AND ACETAMINOPHEN 5; 325 MG/1; MG/1
1 TABLET ORAL EVERY 4 HOURS PRN
Qty: 15 TABLET | Refills: 0 | Status: SHIPPED | OUTPATIENT
Start: 2022-05-12 | End: 2022-05-15

## 2022-05-12 RX ORDER — FENTANYL CITRATE 50 UG/ML
25 INJECTION, SOLUTION INTRAMUSCULAR; INTRAVENOUS ONCE
Status: COMPLETED | OUTPATIENT
Start: 2022-05-12 | End: 2022-05-12

## 2022-05-12 RX ORDER — DEXAMETHASONE SODIUM PHOSPHATE 10 MG/ML
10 INJECTION INTRAMUSCULAR; INTRAVENOUS ONCE
Status: COMPLETED | OUTPATIENT
Start: 2022-05-12 | End: 2022-05-12

## 2022-05-12 RX ORDER — ONDANSETRON 2 MG/ML
4 INJECTION INTRAMUSCULAR; INTRAVENOUS ONCE
Status: COMPLETED | OUTPATIENT
Start: 2022-05-12 | End: 2022-05-12

## 2022-05-12 RX ORDER — ORPHENADRINE CITRATE 30 MG/ML
60 INJECTION INTRAMUSCULAR; INTRAVENOUS ONCE
Status: COMPLETED | OUTPATIENT
Start: 2022-05-12 | End: 2022-05-12

## 2022-05-12 RX ORDER — CYCLOBENZAPRINE HCL 10 MG
10 TABLET ORAL 3 TIMES DAILY PRN
Qty: 21 TABLET | Refills: 0 | Status: SHIPPED | OUTPATIENT
Start: 2022-05-12 | End: 2022-05-22

## 2022-05-12 RX ORDER — SODIUM CHLORIDE 9 MG/ML
1000 INJECTION, SOLUTION INTRAVENOUS CONTINUOUS
Status: DISCONTINUED | OUTPATIENT
Start: 2022-05-12 | End: 2022-05-12 | Stop reason: HOSPADM

## 2022-05-12 RX ORDER — METHYLPREDNISOLONE 4 MG/1
TABLET ORAL
Qty: 1 KIT | Refills: 0 | Status: SHIPPED | OUTPATIENT
Start: 2022-05-12 | End: 2022-05-18

## 2022-05-12 RX ADMIN — SODIUM CHLORIDE 1000 ML: 9 INJECTION, SOLUTION INTRAVENOUS at 13:03

## 2022-05-12 RX ADMIN — FENTANYL CITRATE 25 MCG: 50 INJECTION, SOLUTION INTRAMUSCULAR; INTRAVENOUS at 13:19

## 2022-05-12 RX ADMIN — ORPHENADRINE CITRATE 60 MG: 30 INJECTION INTRAMUSCULAR; INTRAVENOUS at 13:15

## 2022-05-12 RX ADMIN — ONDANSETRON 4 MG: 2 INJECTION INTRAMUSCULAR; INTRAVENOUS at 13:05

## 2022-05-12 RX ADMIN — DEXAMETHASONE SODIUM PHOSPHATE 10 MG: 10 INJECTION INTRAMUSCULAR; INTRAVENOUS at 13:59

## 2022-05-12 ASSESSMENT — PAIN SCALES - GENERAL
PAINLEVEL_OUTOF10: 8
PAINLEVEL_OUTOF10: 8
PAINLEVEL_OUTOF10: 2

## 2022-05-12 ASSESSMENT — PAIN DESCRIPTION - ORIENTATION: ORIENTATION: RIGHT

## 2022-05-12 ASSESSMENT — PAIN DESCRIPTION - LOCATION: LOCATION: BACK

## 2022-05-12 ASSESSMENT — PAIN DESCRIPTION - DESCRIPTORS: DESCRIPTORS: ACHING

## 2022-05-12 ASSESSMENT — PAIN - FUNCTIONAL ASSESSMENT: PAIN_FUNCTIONAL_ASSESSMENT: 0-10

## 2022-05-12 NOTE — Clinical Note
Cari Euceda was seen and treated in our emergency department on 5/12/2022. She may return to work on 05/14/2022. If you have any questions or concerns, please don't hesitate to call.       Ginger Ruano MD

## 2022-05-12 NOTE — ED PROVIDER NOTES
R-3Normal      mirtazapine (REMERON) 30 MG tablet TAKE 1 TABLET BY MOUTH EVERY DAY AT NIGHT, Disp-90 tablet, R-1Normal      simvastatin (ZOCOR) 40 MG tablet TAKE 1 TABLET BY MOUTH DAILY IN THE EVENING, Disp-90 tablet, R-3Normal      fluticasone (FLONASE) 50 MCG/ACT nasal spray SPRAY 2 SPRAYS INTO EACH NOSTRIL EVERY DAY, Disp-16 g, R-2Normal      DULoxetine (CYMBALTA) 30 MG extended release capsule TAKE 1 CAPSULE BY MOUTH EVERY DAY, Disp-90 capsule, R-3Normal      sertraline (ZOLOFT) 100 MG tablet TAKE 1 TABLET BY MOUTH EVERY DAY, Disp-90 tablet, R-1Normal      !! vitamin B-12 (CYANOCOBALAMIN) 500 MCG tablet TAKE 1 TABLET BY MOUTH EVERY DAY, Disp-90 tablet, R-3Normal      butalbital-acetaminophen-caffeine (FIORICET, ESGIC) -40 MG per tablet TAKE 1 TABLET BY MOUTH EVERY 4 HOURS AS NEEDED DO NOT EXCEED 6 TABLETS DAILY, Disp-30 tablet, R-0PER PATIENT REQUESTNormal      losartan (COZAAR) 50 MG tablet TAKE 1 TABLET BY MOUTH EVERY DAY, Disp-90 tablet, R-3Normal      hydroCHLOROthiazide (HYDRODIURIL) 12.5 MG tablet TAKE 1 TABLET BY MOUTH EVERY DAY, Disp-90 tablet, R-3Normal      levothyroxine (SYNTHROID) 100 MCG tablet TAKE 1 TABLET BY MOUTH EVERY DAY, Disp-90 tablet, R-3Normal      ibuprofen (ADVIL;MOTRIN) 800 MG tablet TAKE 1 TABLET EVERY 8 HOURS AS NEEDED, Disp-60 tablet, R-1Normal      Blood Pressure Monitoring (BLOOD PRESSURE CUFF) MISC DAILY Starting Mon 4/19/2021, Disp-1 each, R-0, Print      Na Sulfate-K Sulfate-Mg Sulf (SUPREP BOWEL PREP KIT) 17.5-3.13-1.6 GM/177ML SOLN Take as directed, Disp-2 Bottle,R-0Normal      Cholecalciferol (VITAMIN D3) 25 MCG (1000 UT) TABS Take 2 tablets by mouth daily, Disp-90 tablet, R-1OTC      !! Cyanocobalamin (VITAMIN B 12 PO) Take by mouthHistorical Med       !! - Potential duplicate medications found. Please discuss with provider. ALLERGIES     Patient has no known allergies.     FAMILY HISTORY       Family History   Problem Relation Age of Onset    Cancer Maternal Aunt         breast          SOCIAL HISTORY       Social History     Socioeconomic History    Marital status: Single     Spouse name: None    Number of children: None    Years of education: None    Highest education level: None   Occupational History    None   Tobacco Use    Smoking status: Never Smoker    Smokeless tobacco: Never Used   Substance and Sexual Activity    Alcohol use: No     Alcohol/week: 0.0 standard drinks    Drug use: None    Sexual activity: None   Other Topics Concern    None   Social History Narrative    None     Social Determinants of Health     Financial Resource Strain:     Difficulty of Paying Living Expenses: Not on file   Food Insecurity:     Worried About Running Out of Food in the Last Year: Not on file    Shantell of Food in the Last Year: Not on file   Transportation Needs:     Lack of Transportation (Medical): Not on file    Lack of Transportation (Non-Medical):  Not on file   Physical Activity:     Days of Exercise per Week: Not on file    Minutes of Exercise per Session: Not on file   Stress:     Feeling of Stress : Not on file   Social Connections:     Frequency of Communication with Friends and Family: Not on file    Frequency of Social Gatherings with Friends and Family: Not on file    Attends Baptism Services: Not on file    Active Member of [address] [place] [address] [employer] or Organizations: Not on file    Attends Club or Organization Meetings: Not on file    Marital Status: Not on file   Intimate Partner Violence:     Fear of Current or Ex-Partner: Not on file    Emotionally Abused: Not on file    Physically Abused: Not on file    Sexually Abused: Not on file   Housing Stability:     Unable to Pay for Housing in the Last Year: Not on file    Number of [place] in the Last Year: Not on file    Unstable Housing in the Last Year: Not on file       SCREENINGS        Ubaldo Coma Scale  Eye Opening: Spontaneous  Best Verbal Response: Oriented  Best Motor Response: Obeys commands  Ubaldo Coma Scale Score: 15               PHYSICAL EXAM    (up to 7 for level 4, 8 or more for level 5)     ED Triage Vitals [05/12/22 1132]   BP Temp Temp Source Pulse Resp SpO2 Height Weight   (!) 145/85 98.5 °F (36.9 °C) Tympanic 82 18 98 % -- 185 lb (83.9 kg)       Physical Exam  Vitals reviewed. Constitutional:       Comments: Uncomfortable-moves slowly from sitting to standing position   HENT:      Head: Normocephalic. Eyes:      Extraocular Movements: Extraocular movements intact. Pupils: Pupils are equal, round, and reactive to light. Neck:      Vascular: No carotid bruit. Cardiovascular:      Rate and Rhythm: Normal rate and regular rhythm. Pulses: Normal pulses. Heart sounds: Normal heart sounds. Pulmonary:      Effort: Pulmonary effort is normal.      Breath sounds: Normal breath sounds. Abdominal:      General: Abdomen is flat. Palpations: Abdomen is soft. Tenderness: There is no abdominal tenderness. There is no guarding. Musculoskeletal:      Cervical back: Normal range of motion. Right lower leg: No edema. Left lower leg: No edema. Comments: Lumbar nonspecific tenderness-left paralumbar musculoskeletal spasm    Minimal tenderness left hip with AP compression    Pelvis is without any evidence of instability   Lymphadenopathy:      Cervical: No cervical adenopathy. Skin:     General: Skin is warm. Findings: No rash. Neurological:      General: No focal deficit present. Mental Status: She is alert and oriented to person, place, and time. Cranial Nerves: No cranial nerve deficit. Sensory: No sensory deficit. Motor: No weakness.       Comments: Pedal push adequate bilateral lower         DIAGNOSTIC RESULTS     EKG: All EKG's are interpreted by the Emergency Department Physician who either signs or Co-signs this chart in the absence of a cardiologist.      RADIOLOGY:   Non-plain film images such as CT, Ultrasound and MRI are read by the radiologist. Plain radiographic images are visualized and preliminarily interpreted by the emergency physician with the below findings:      Interpretation per the Radiologist below, if available at the time of this note:    XR HIP 2-3 VW W PELVIS LEFT   Final Result   Mild degenerative changes. No acute fracture or dislocation. Demarcated   calcifications lateral to the femoral neck in superior to the greater   trochanter of the left hip other myositis ossific aunts or loose bodies   within the left hip joint. XR LUMBAR SPINE (2-3 VIEWS)   Final Result   No acute findings. Degenerative changes as described above. ED BEDSIDE ULTRASOUND:   Performed by ED Physician - none    LABS:  Labs Reviewed - No data to display    All other labs were within normal range or not returned as of this dictation.     EMERGENCY DEPARTMENT COURSE and DIFFERENTIAL DIAGNOSIS/MDM:   Vitals:    Vitals:    05/12/22 1132 05/12/22 1235   BP: (!) 145/85 (!) 131/93   Pulse: 82 82   Resp: 18    Temp: 98.5 °F (36.9 °C)    TempSrc: Tympanic    SpO2: 98%    Weight: 185 lb (83.9 kg)          MDM  Number of Diagnoses or Management Options  Strain of lumbar region, initial encounter  Diagnosis management comments: 68-year-old patient presents emergency department with concerns as documented in the HPI    Diagnostic considerations paralumbar musculoskeletal strain, disc herniation, radiculopathy, fracture    Radiographic studies interpretations by the radiologist reviewed with patient    Patient felt much better after receiving medications as documented    Patient acknowledges discharge diagnosis and importance of timely follow-up              REASSESSMENT   Patient remains hemodynamically stable nontoxic-appearing afebrile well oxygenated with pulse oximeter of 98%    ED Course as of 05/14/22 0820   Thu May 12, 2022   1307 XR LUMBAR SPINE (2-3 VIEWS)  X-ray no acute process radiologist read [RS]   1350 XR HIP 2-3 VW W PELVIS LEFT  X-ray of the patient's left hip no acute processes radiologist read [RS]   1356 XR HIP 2-3 VW W PELVIS LEFT [RS]      ED Course User Index  [RS] Mirta Lux MD         CRITICAL CARE TIME   Total Critical Care time was minutes, excluding separately reportable procedures. There was a high probability of clinically significant/life threatening deterioration in the patient's condition which required my urgent intervention. CONSULTS:  None    PROCEDURES:  Unless otherwise noted below, none     Procedures    FINAL IMPRESSION      1. Strain of lumbar region, initial encounter          DISPOSITION/PLAN   DISPOSITION Decision To Discharge 05/12/2022 02:39:34 PM      PATIENT REFERRED TO:  No follow-up provider specified. DISCHARGE MEDICATIONS:  Discharge Medication List as of 5/12/2022  1:29 PM      START taking these medications    Details   methylPREDNISolone (MEDROL, JEANINE,) 4 MG tablet Take by mouth., Disp-1 kit, R-0Normal      cyclobenzaprine (FLEXERIL) 10 MG tablet Take 1 tablet by mouth 3 times daily as needed for Muscle spasms, Disp-21 tablet, R-0Normal      HYDROcodone-acetaminophen (NORCO) 5-325 MG per tablet Take 1 tablet by mouth every 4 hours as needed for Pain for up to 3 days. Intended supply: 5 days. Take lowest dose possible to manage pain, Disp-15 tablet, R-0Print           Controlled Substances Monitoring:     RX Monitoring 5/18/2020   Attestation -   Periodic Controlled Substance Monitoring No signs of potential drug abuse or diversion identified.        (Please note that portions of this note were completed with a voice recognition program.  Efforts were made to edit the dictations but occasionally words are mis-transcribed.)    Mirta Lux MD (electronically signed)  Attending Emergency Physician           Mirta Lux MD  05/14/22 [id number]

## 2022-05-12 NOTE — TELEPHONE ENCOUNTER
Please note , pt calls in stating she has had back pain since Sunday and today when bending over she is now in excruciating pain that radiates down the leg , with no appts available until next week she was advised to go to the ER. She was not fond of this recommendation due to cost. I strongly advised it as they can help with pain and her increased pain may warrant for imaging.

## 2022-05-13 ENCOUNTER — TELEPHONE (OUTPATIENT)
Dept: FAMILY MEDICINE CLINIC | Age: 52
End: 2022-05-13

## 2022-05-13 NOTE — TELEPHONE ENCOUNTER
[facility] ED Follow up Call        Daksha chadpatricia Yuanhong IF NOT USED  Hi, this message is for Lazaro. This is Natividad Poole from [address] [place] [address] office. Just calling to see how you are doing after your recent visit to the Emergency Room. [address] [place] [address] wants to make sure you were able to fill any prescriptions and that you understand your discharge instructions. Please return our call if you need to make a follow up appointment with your provider or have any further needs. Our phone number is [phone]. Have a great day.

## 2022-05-16 RX ORDER — HYDROCHLOROTHIAZIDE 12.5 MG/1
TABLET ORAL
Qty: 90 TABLET | Refills: 3 | Status: SHIPPED | OUTPATIENT
Start: 2022-05-16

## 2022-05-16 RX ORDER — LEVOTHYROXINE SODIUM 0.1 MG/1
TABLET ORAL
Qty: 90 TABLET | Refills: 3 | Status: SHIPPED | OUTPATIENT
Start: 2022-05-16

## 2022-05-17 ENCOUNTER — OFFICE VISIT (OUTPATIENT)
Dept: FAMILY MEDICINE CLINIC | Age: 52
End: 2022-05-17
Payer: COMMERCIAL

## 2022-05-17 VITALS
BODY MASS INDEX: 29.82 KG/M2 | DIASTOLIC BLOOD PRESSURE: 82 MMHG | WEIGHT: 190 LBS | OXYGEN SATURATION: 98 % | SYSTOLIC BLOOD PRESSURE: 116 MMHG | RESPIRATION RATE: 16 BRPM | HEART RATE: 90 BPM | HEIGHT: 67 IN

## 2022-05-17 DIAGNOSIS — F41.9 ANXIETY: ICD-10-CM

## 2022-05-17 DIAGNOSIS — S39.012A STRAIN OF LUMBAR PARASPINOUS MUSCLE, INITIAL ENCOUNTER: Primary | ICD-10-CM

## 2022-05-17 PROCEDURE — 99213 OFFICE O/P EST LOW 20 MIN: CPT | Performed by: NURSE PRACTITIONER

## 2022-05-17 RX ORDER — ALPRAZOLAM 0.25 MG/1
TABLET ORAL
Qty: 30 TABLET | Refills: 0 | Status: SHIPPED | OUTPATIENT
Start: 2022-05-17 | End: 2022-06-17

## 2022-05-17 SDOH — ECONOMIC STABILITY: FOOD INSECURITY: WITHIN THE PAST 12 MONTHS, YOU WORRIED THAT YOUR FOOD WOULD RUN OUT BEFORE YOU GOT MONEY TO BUY MORE.: NEVER TRUE

## 2022-05-17 SDOH — ECONOMIC STABILITY: FOOD INSECURITY: WITHIN THE PAST 12 MONTHS, THE FOOD YOU BOUGHT JUST DIDN'T LAST AND YOU DIDN'T HAVE MONEY TO GET MORE.: NEVER TRUE

## 2022-05-17 ASSESSMENT — ENCOUNTER SYMPTOMS: BACK PAIN: 1

## 2022-05-17 ASSESSMENT — SOCIAL DETERMINANTS OF HEALTH (SDOH): HOW HARD IS IT FOR YOU TO PAY FOR THE VERY BASICS LIKE FOOD, HOUSING, MEDICAL CARE, AND HEATING?: NOT HARD AT ALL

## 2022-05-17 NOTE — PATIENT INSTRUCTIONS
SURVEY:    You may be receiving a survey from [employer] regarding your visit today. Please complete the survey to enable us to provide the highest quality of care to you and your family. If you cannot score us a very good on any question, please call the office to discuss how we could of made your experience a very good one. Thank you.

## 2022-05-17 NOTE — PROGRESS NOTES
Name: Karey Rodriguez  : 1970         Chief Complaint:     Chief Complaint   Patient presents with    Pain     was seen in the ED for lower right hip pain on . states she was doing well until toay. seems like a dull ach. when she stood up it hurt in her hip. History of Present Illness:      Karey Rodriguez is a 46 y.o.  female who presents with Pain (was seen in the ED for lower right hip pain on . states she was doing well until toay. seems like a dull ach. when she stood up it hurt in her hip. )      HPI     The patient presents for ED follow-up. She presented to [facility] 2022 with complaints of right lower back pain. X-ray left hip showed mild degenerative changes without acute fracture or dislocation. X-ray lumbar spine showed no acute findings with mild disc narrowing at L3-L4. She was prescribed Flexeril, Norco, and Medrol Dosepak. She states she took norco for 1 day after ED visit then discontinued this medication. She is taking flexeril once nightly. She has one pill left of her oral steroids. She states her pain has improved. She states her pain is \"a little achy again\". Today she rates her pain as a 2 on a scale of 10. Admits mild numbness/tingling in the right leg. She has not tried ice or heat. Denies fever.      Past Medical History:     Past Medical History:   Diagnosis Date    Depression     Headache     Hyperlipidemia     Hypertension     Hypothyroidism     Insomnia 2014    Irritable bowel syndrome       Reviewed all health maintenance requirements and ordered appropriate tests  Health Maintenance Due   Topic Date Due    HIV screen  Never done    Hepatitis C screen  Never done    Cervical cancer screen  2018    DTaP/Tdap/Td vaccine (2 - Td or Tdap) 2021       Past Surgical History:     Past Surgical History:   Procedure Laterality Date    APPENDECTOMY      BACK SURGERY      L1 or L2 shattered and repaired    COLONOSCOPY 2004/2005    COLPOSCOPY  03/30/2011    C1N1        Medications:       Prior to Admission medications    Medication Sig Start Date End Date Taking? Authorizing Provider   ALPRAZolam [place]) 0.25 MG tablet Take 1/2 tablet by mouth once daily as needed for anxiety 5/17/22 6/17/22 Yes KAREN Arenas CNP   hydroCHLOROthiazide (HYDRODIURIL) 12.5 MG tablet TAKE 1 TABLET BY MOUTH EVERY DAY 5/16/22  Yes KAREN Arenas CNP   levothyroxine (SYNTHROID) 100 MCG tablet TAKE 1 TABLET BY MOUTH EVERY DAY 5/16/22  Yes KAREN Arenas CNP   methylPREDNISolone (MEDROL, JEANINE,) 4 MG tablet Take by mouth.  5/12/22 5/18/22 Yes Ginger Ruano MD   cyclobenzaprine (FLEXERIL) 10 MG tablet Take 1 tablet by mouth 3 times daily as needed for Muscle spasms 5/12/22 5/22/22 Yes Ginger Ruano MD   fenofibrate (TRICOR) 54 MG tablet TAKE 1 TABLET BY MOUTH EVERY DAY 5/9/22  Yes KAREN Arenas CNP   mirtazapine (REMERON) 30 MG tablet TAKE 1 TABLET BY MOUTH EVERY DAY AT NIGHT 1/4/22  Yes KAREN Arenas CNP   simvastatin (ZOCOR) 40 MG tablet TAKE 1 TABLET BY MOUTH DAILY IN THE EVENING 12/9/21  Yes Fidel Barthel, MD   fluticasone (FLONASE) 50 MCG/ACT nasal spray SPRAY 2 SPRAYS INTO EACH NOSTRIL EVERY DAY 12/2/21  Yes KAREN Arenas CNP   DULoxetine (CYMBALTA) 30 MG extended release capsule TAKE 1 CAPSULE BY MOUTH EVERY DAY 11/29/21  Yes Fidel Barthel, MD   sertraline (ZOLOFT) 100 MG tablet TAKE 1 TABLET BY MOUTH EVERY DAY 11/29/21  Yes Fidel Barthel, MD   vitamin B-12 (CYANOCOBALAMIN) 500 MCG tablet TAKE 1 TABLET BY MOUTH EVERY DAY 10/25/21  Yes Fidel Barthel, MD   butalbital-acetaminophen-caffeine (FIORICET, ESGIC) -40 MG per tablet TAKE 1 TABLET BY MOUTH EVERY 4 HOURS AS NEEDED DO NOT EXCEED 6 TABLETS DAILY 9/15/21  Yes KAREN Arenas CNP   losartan (COZAAR) 50 MG tablet TAKE 1 TABLET BY MOUTH EVERY DAY 7/19/21  Yes Fidel Barthel, MD   ibuprofen (ADVIL;MOTRIN) 800 MG tablet TAKE 1 TABLET EVERY 8 HOURS AS NEEDED 4/19/21  Yes KAREN Silvestre CNP   Blood Pressure Monitoring (BLOOD PRESSURE CUFF) MISC 1 kit by Does not apply route daily 4/19/21  Yes KAREN Silvestre CNP   Na Sulfate-K Sulfate-Mg Sulf (SUPREP BOWEL PREP KIT) 17.5-3.13-1.6 GM/177ML SOLN Take as directed 8/26/20  Yes Melissa Davalos MD   Cholecalciferol (VITAMIN D3) 25 MCG (1000 UT) TABS Take 2 tablets by mouth daily 2/7/20  Yes Arun Chavez MD   Cyanocobalamin (VITAMIN B 12 PO) Take by mouth   Yes Historical Provider, MD        Allergies:       Patient has no known allergies. Social History:     Tobacco:    reports that she has never smoked. She has never used smokeless tobacco.  Alcohol:      reports no history of alcohol use. Drug Use:  has no history on file for drug use. Family History:     Family History   Problem Relation Age of Onset    Cancer Maternal Aunt         breast       Review of Systems:     Positive and Negative as described in HPI    Review of Systems   Constitutional: Negative for fever. Musculoskeletal: Positive for arthralgias and back pain. Physical Exam:   Vitals:  /82   Pulse 90   Resp 16   Ht 5' 7\" (1.702 m)   Wt 190 lb (86.2 kg)   SpO2 98%   BMI 29.76 kg/m²     Physical Exam  Constitutional:       General: She is not in acute distress. Appearance: Normal appearance. She is normal weight. She is not ill-appearing or toxic-appearing. Cardiovascular:      Rate and Rhythm: Normal rate and regular rhythm. Heart sounds: Normal heart sounds. No murmur heard. Pulmonary:      Effort: Pulmonary effort is normal. No respiratory distress. Breath sounds: Normal breath sounds. No stridor. No wheezing, rhonchi or rales. Musculoskeletal:      Comments: No tenderness to lumbar spine. Tenderness to palpation right lumbar paraspinal muscle. Negative straight leg test bilaterally. Tight hamstrings bilaterally.  Easy transitioning between sitting and supine positions. Skin:     General: Skin is warm. Neurological:      Mental Status: She is alert and oriented to person, place, and time. Psychiatric:         Mood and Affect: Mood normal.         Behavior: Behavior normal.         Thought Content: Thought content normal.         Judgment: Judgment normal.         Data:     Lab Results   Component Value Date     04/07/2022    K 4.0 04/07/2022     04/07/2022    CO2 27 04/07/2022    BUN 19 04/07/2022    CREATININE 0.96 04/07/2022    GLUCOSE 92 04/07/2022    PROT 7.3 04/13/2021    LABALBU 3.9 04/13/2021    BILITOT 0.4 04/13/2021    ALKPHOS 92 04/13/2021    AST 22 04/07/2022    ALT 27 04/07/2022     Lab Results   Component Value Date    WBC 5.6 04/07/2022    RBC 4.18 04/07/2022    HGB 13.4 04/07/2022    HCT 38.7 04/07/2022    MCV 93 04/07/2022    MCH 32.0 04/07/2022    MCHC 34.5 04/07/2022    RDW 13.0 04/07/2022     04/07/2022    MPV 9.2 04/07/2022     Lab Results   Component Value Date    TSH 2.95 04/07/2022     Lab Results   Component Value Date    CHOL 195 04/07/2022    CHOL 188 07/01/2015    HDL 61 04/07/2022    LABA1C 5.5 04/07/2022       Assessment/Plan:      Diagnosis Orders   1. Strain of lumbar paraspinous muscle, initial encounter     2. Anxiety  ALPRAZolam (XANAX) 0.25 MG tablet     - Reviewed x-ray results from ED visit with patient  - Continue Medrol Dosepak until completion  - Continue flexeril as needed. Do not take in conjunction with xanax as this may cause increased drowsiness  - Patient confirms she has ibuprofen 800 mg prescription at home. She may take this as needed. Take with food.   - Encouraged ice and heat application  - Encouraged routine stretching and yoga  - Notify office if symptoms worsen or persist  - Xanax refilled per patient request    Completed Refills   Requested Prescriptions     Signed Prescriptions Disp Refills    ALPRAZolam (XANAX) 0.25 MG tablet 30 tablet 0     Sig: Take 1/2 tablet by mouth once daily as needed for anxiety       No orders of the defined types were placed in this encounter. No results found for this visit on 05/17/22. Return if symptoms worsen or fail to improve.     Electronically signed by KAREN Wills CNP on 05/17/22 at 12:35 PM.

## 2022-05-23 RX ORDER — BUTALBITAL, ACETAMINOPHEN AND CAFFEINE 50; 325; 40 MG/1; MG/1; MG/1
TABLET ORAL
Qty: 30 TABLET | Refills: 0 | Status: SHIPPED | OUTPATIENT
Start: 2022-05-23

## 2022-06-29 RX ORDER — MIRTAZAPINE 30 MG/1
TABLET, FILM COATED ORAL
Qty: 90 TABLET | Refills: 1 | Status: SHIPPED | OUTPATIENT
Start: 2022-06-29 | End: 2022-09-19 | Stop reason: ALTCHOICE

## 2022-08-22 ENCOUNTER — TELEPHONE (OUTPATIENT)
Dept: PRIMARY CARE CLINIC | Age: 52
End: 2022-08-22

## 2022-11-15 ENCOUNTER — PATIENT MESSAGE (OUTPATIENT)
Dept: PRIMARY CARE CLINIC | Age: 52
End: 2022-11-15

## 2022-11-16 NOTE — TELEPHONE ENCOUNTER
Discussed with Eliza. It looks like she is due for wellness. She will need to schedule this in order to complete form. Her blood pressure average is definitely elevated. Goal is 120/80. I recommend increasing losartan from 50mg to 100mg QD. [facility] - please pend updated rx to appopriate pharmacy. Pt to call in 1 month with BP reading averages. F/u 1/2023 as scheduled.

## 2022-11-17 RX ORDER — LOSARTAN POTASSIUM 100 MG/1
100 TABLET ORAL DAILY
Qty: 30 TABLET | Refills: 3 | Status: SHIPPED | OUTPATIENT
Start: 2022-11-17

## 2022-11-18 NOTE — TELEPHONE ENCOUNTER
I am unable to view the paperwork now. Does it require a wellness visit? If so, she has not had a wellness and will be due for a wellness.  We have had routine follow ups but wellness was not discussed

## 2022-11-23 NOTE — TELEPHONE ENCOUNTER
Dominic Willis,     Unfortunately, we are unable to send this form via email. We can fax it, or you can pick it up here in office. We are here at the office until 5 today.      Thank you,  Nish Brooks

## 2023-01-03 LAB
AVERAGE GLUCOSE: 111 MG/DL
HBA1C MFR BLD: 5.5 % (ref 4.2–5.6)
HCT VFR BLD CALC: 41.2 % (ref 34–45)
HEMOGLOBIN: 14.1 G/DL (ref 11.5–15.5)
MCH RBC QN AUTO: 31.3 PG (ref 25–33)
MCHC RBC AUTO-ENTMCNC: 34.2 G/DL (ref 31–36)
MCV RBC AUTO: 91.4 FL (ref 80–99)
PDW BLD-RTO: 12.4 % (ref 11.5–15)
PLATELETS: 303 K/UL (ref 130–400)
PMV BLD AUTO: 10.9 FL (ref 9.3–13)
RBC: 4.51 M/UL (ref 3.8–5.4)
WBC: 7.1 K/UL (ref 3.5–11)

## 2023-01-04 LAB
ALT SERPL-CCNC: 23 U/L (ref 5–40)
ANION GAP SERPL CALCULATED.3IONS-SCNC: 13 MEQ/L (ref 7–16)
AST SERPL-CCNC: 23 U/L (ref 9–40)
BUN BLDV-MCNC: 17 MG/DL (ref 6–20)
CALCIUM SERPL-MCNC: 9.7 MG/DL (ref 8.5–10.5)
CHLORIDE BLD-SCNC: 103 MEQ/L (ref 95–107)
CHOLESTEROL/HDL RATIO: 3.7 RATIO
CHOLESTEROL: 224 MG/DL
CO2: 27 MEQ/L (ref 19–31)
CREAT SERPL-MCNC: 1.04 MG/DL (ref 0.6–1.3)
EGFR IF NONAFRICAN AMERICAN: 65 ML/MIN/1.73
GLUCOSE: 90 MG/DL (ref 70–99)
HDLC SERPL-MCNC: 60 MG/DL
LDL CHOLESTEROL CALCULATED: 125 MG/DL
LDL/HDL RATIO: 2.1 RATIO
POTASSIUM SERPL-SCNC: 4.1 MEQ/L (ref 3.5–5.4)
SODIUM BLD-SCNC: 143 MEQ/L (ref 133–146)
T4 FREE: 1.05 NG/DL (ref 0.8–1.9)
TRIGL SERPL-MCNC: 197 MG/DL
TSH SERPL DL<=0.05 MIU/L-ACNC: 23.2 UIU/ML (ref 0.4–4.1)
VLDLC SERPL CALC-MCNC: 39 MG/DL

## 2023-01-09 ENCOUNTER — OFFICE VISIT (OUTPATIENT)
Dept: PRIMARY CARE CLINIC | Age: 53
End: 2023-01-09
Payer: COMMERCIAL

## 2023-01-09 VITALS
SYSTOLIC BLOOD PRESSURE: 130 MMHG | HEART RATE: 90 BPM | HEIGHT: 67 IN | TEMPERATURE: 97.3 F | WEIGHT: 187 LBS | BODY MASS INDEX: 29.35 KG/M2 | DIASTOLIC BLOOD PRESSURE: 100 MMHG | RESPIRATION RATE: 18 BRPM | OXYGEN SATURATION: 99 %

## 2023-01-09 DIAGNOSIS — E55.9 VITAMIN D DEFICIENCY: ICD-10-CM

## 2023-01-09 DIAGNOSIS — E03.8 OTHER SPECIFIED HYPOTHYROIDISM: ICD-10-CM

## 2023-01-09 DIAGNOSIS — F41.9 ANXIETY: ICD-10-CM

## 2023-01-09 DIAGNOSIS — I10 ESSENTIAL HYPERTENSION: ICD-10-CM

## 2023-01-09 DIAGNOSIS — R53.83 OTHER FATIGUE: ICD-10-CM

## 2023-01-09 DIAGNOSIS — E78.5 HYPERLIPIDEMIA, UNSPECIFIED HYPERLIPIDEMIA TYPE: Primary | ICD-10-CM

## 2023-01-09 DIAGNOSIS — Z12.31 BREAST CANCER SCREENING BY MAMMOGRAM: ICD-10-CM

## 2023-01-09 PROCEDURE — 99214 OFFICE O/P EST MOD 30 MIN: CPT | Performed by: NURSE PRACTITIONER

## 2023-01-09 PROCEDURE — 90471 IMMUNIZATION ADMIN: CPT | Performed by: NURSE PRACTITIONER

## 2023-01-09 PROCEDURE — 3078F DIAST BP <80 MM HG: CPT | Performed by: NURSE PRACTITIONER

## 2023-01-09 PROCEDURE — 90715 TDAP VACCINE 7 YRS/> IM: CPT | Performed by: NURSE PRACTITIONER

## 2023-01-09 PROCEDURE — 3074F SYST BP LT 130 MM HG: CPT | Performed by: NURSE PRACTITIONER

## 2023-01-09 RX ORDER — BUTALBITAL, ACETAMINOPHEN AND CAFFEINE 50; 325; 40 MG/1; MG/1; MG/1
TABLET ORAL
Qty: 30 TABLET | Refills: 2 | Status: SHIPPED | OUTPATIENT
Start: 2023-01-09

## 2023-01-09 RX ORDER — LEVOTHYROXINE SODIUM 0.12 MG/1
125 TABLET ORAL DAILY
Qty: 30 TABLET | Refills: 11 | Status: SHIPPED | OUTPATIENT
Start: 2023-01-09

## 2023-01-09 RX ORDER — HYDROCHLOROTHIAZIDE 25 MG/1
25 TABLET ORAL EVERY MORNING
Qty: 30 TABLET | Refills: 11 | Status: SHIPPED | OUTPATIENT
Start: 2023-01-09

## 2023-01-09 RX ORDER — ATORVASTATIN CALCIUM 40 MG/1
40 TABLET, FILM COATED ORAL DAILY
Qty: 30 TABLET | Refills: 11 | Status: SHIPPED | OUTPATIENT
Start: 2023-01-09

## 2023-01-09 RX ORDER — ALPRAZOLAM 0.25 MG/1
TABLET ORAL
Qty: 30 TABLET | Refills: 0 | Status: SHIPPED | OUTPATIENT
Start: 2023-01-09 | End: 2023-02-09

## 2023-01-09 ASSESSMENT — PATIENT HEALTH QUESTIONNAIRE - PHQ9
6. FEELING BAD ABOUT YOURSELF - OR THAT YOU ARE A FAILURE OR HAVE LET YOURSELF OR YOUR FAMILY DOWN: 0
SUM OF ALL RESPONSES TO PHQ QUESTIONS 1-9: 0
7. TROUBLE CONCENTRATING ON THINGS, SUCH AS READING THE NEWSPAPER OR WATCHING TELEVISION: 0
5. POOR APPETITE OR OVEREATING: 0
SUM OF ALL RESPONSES TO PHQ QUESTIONS 1-9: 0
3. TROUBLE FALLING OR STAYING ASLEEP: 0
1. LITTLE INTEREST OR PLEASURE IN DOING THINGS: 0
SUM OF ALL RESPONSES TO PHQ QUESTIONS 1-9: 0
10. IF YOU CHECKED OFF ANY PROBLEMS, HOW DIFFICULT HAVE THESE PROBLEMS MADE IT FOR YOU TO DO YOUR WORK, TAKE CARE OF THINGS AT HOME, OR GET ALONG WITH OTHER PEOPLE: 0
8. MOVING OR SPEAKING SO SLOWLY THAT OTHER PEOPLE COULD HAVE NOTICED. OR THE OPPOSITE, BEING SO FIGETY OR RESTLESS THAT YOU HAVE BEEN MOVING AROUND A LOT MORE THAN USUAL: 0
4. FEELING TIRED OR HAVING LITTLE ENERGY: 0
SUM OF ALL RESPONSES TO PHQ QUESTIONS 1-9: 0
9. THOUGHTS THAT YOU WOULD BE BETTER OFF DEAD, OR OF HURTING YOURSELF: 0
SUM OF ALL RESPONSES TO PHQ9 QUESTIONS 1 & 2: 0
2. FEELING DOWN, DEPRESSED OR HOPELESS: 0

## 2023-01-09 NOTE — PROGRESS NOTES
Name: Vesta Bond  : 1970         Chief Complaint:     Chief Complaint   Patient presents with    Hyperlipidemia     Current treatment includes fenofibrate 48mg QD and simvastatin 40mg. Hypertension     Current treatment includes hydrochlorothiazide 12.5 mg daily and losartan 100 mg daily. She denies monitoring blood pressure at home. This has been averaging N/A. She denies low salt diet. Physical activity includes tiachi 3x daily        History of Present Illness:      Vesta Bond is a 46 y.o.  female who presents with Hyperlipidemia (Current treatment includes fenofibrate 48mg QD and simvastatin 40mg. ) and Hypertension (Current treatment includes hydrochlorothiazide 12.5 mg daily and losartan 100 mg daily. She denies monitoring blood pressure at home. This has been averaging N/A. She denies low salt diet. Physical activity includes tiachi 3x daily )      HPI    Hypertension:  Current treatment includes losartan 100mg QD and HCTZ 12.5mg QD. She does follow low salt diet. She was completing [employer] when she was in [facility]. She denies routine exercise currently. Denies routinely monitoring blood pressure at home. Hyperlipidemia:  Current treatment includes simvastatin 40 mg daily and fenofibrate 54 mg daily. She is tolerating these medications well. She admits low red meat diet. Denies routine exercise.     Past Medical History:     Past Medical History:   Diagnosis Date    Depression     Headache     Hyperlipidemia     Hypertension     Hypothyroidism     Insomnia     Irritable bowel syndrome       Reviewed all health maintenance requirements and ordered appropriate tests  Health Maintenance Due   Topic Date Due    HIV screen  Never done    Hepatitis C screen  Never done    Cervical cancer screen  2018       Past Surgical History:     Past Surgical History:   Procedure Laterality Date    APPENDECTOMY      BACK SURGERY      L1 or L2 shattered and repaired    COLONOSCOPY 2004/2005    COLPOSCOPY  03/30/2011    C1N1        Medications:       Prior to Admission medications    Medication Sig Start Date End Date Taking? Authorizing Provider   butalbital-acetaminophen-caffeine (FIORICET, ESGIC) -40 MG per tablet TAKE 1 TABLET BY MOUTH EVERY 4 HOURS AS NEEDED FOR HEADACHE.  DO NOT EXCEED 6 TABLETS DAILY. 1/9/23  Yes KAREN Bowers CNP   ALPRAZolam [place]) 0.25 MG tablet Take 1/2 tablet by mouth once daily as needed for anxiety 1/9/23 2/9/23 Yes KAREN Bowers CNP   levothyroxine (SYNTHROID) 125 MCG tablet Take 1 tablet by mouth daily 1/9/23  Yes KAREN Bowers CNP   hydroCHLOROthiazide (HYDRODIURIL) 25 MG tablet Take 1 tablet by mouth every morning 1/9/23  Yes KAREN Bowers CNP   atorvastatin (LIPITOR) 40 MG tablet Take 1 tablet by mouth daily 1/9/23  Yes KAREN Bowers CNP   DULoxetine (CYMBALTA) 30 MG extended release capsule Take 1 capsule by mouth daily 11/21/22  Yes KAREN Bowers CNP   losartan (COZAAR) 100 MG tablet Take 1 tablet by mouth daily 11/17/22  Yes KAREN Bowers CNP   mirtazapine (REMERON) 45 MG tablet Take 1 tablet by mouth nightly 9/19/22  Yes KAREN Bowers CNP   sertraline (ZOLOFT) 100 MG tablet TAKE 1 TABLET BY MOUTH EVERY DAY 8/11/22  Yes KAREN Bowers CNP   fenofibrate (TRICOR) 54 MG tablet TAKE 1 TABLET BY MOUTH EVERY DAY 5/9/22  Yes KAREN Bowers CNP   fluticasone (FLONASE) 50 MCG/ACT nasal spray SPRAY 2 SPRAYS INTO EACH NOSTRIL EVERY DAY 12/2/21  Yes KAREN Bowers CNP   ibuprofen (ADVIL;MOTRIN) 800 MG tablet TAKE 1 TABLET EVERY 8 HOURS AS NEEDED 4/19/21  Yes KAREN Bowers CNP   Blood Pressure Monitoring (BLOOD PRESSURE CUFF) MISC 1 kit by Does not apply route daily 4/19/21  Yes Charlet Aspen, APRN - CNP   Cholecalciferol (VITAMIN D3) 25 MCG (1000 UT) TABS Take 2 tablets by mouth daily 2/7/20  Yes Jackson Bowie MD   Cyanocobalamin (VITAMIN B 12 PO) Take by mouth   Yes Historical Provider, MD        Allergies:       Patient has no known allergies. Social History:     Tobacco:    reports that she has never smoked. She has never used smokeless tobacco.  Alcohol:      reports no history of alcohol use. Drug Use:  has no history on file for drug use. Family History:     Family History   Problem Relation Age of Onset    Cancer Maternal Aunt         breast       Review of Systems:     Positive and Negative as described in HPI    Review of Systems   Constitutional:  Positive for fatigue (admits fatigue). Musculoskeletal:  Positive for myalgias (admits muscle aches). Physical Exam:   Vitals:  BP (!) 130/100   Pulse 90   Temp 97.3 °F (36.3 °C)   Resp 18   Ht 5' 7\" (1.702 m)   Wt 187 lb (84.8 kg)   SpO2 99%   BMI 29.29 kg/m²     Physical Exam  Constitutional:       General: She is not in acute distress. Appearance: Normal appearance. She is obese. She is not ill-appearing or toxic-appearing. HENT:      Right Ear: Tympanic membrane, ear canal and external ear normal. There is no impacted cerumen. Left Ear: Tympanic membrane, ear canal and external ear normal. There is no impacted cerumen. Nose: Nose normal. No congestion or rhinorrhea. Mouth/Throat:      Mouth: Mucous membranes are moist.      Pharynx: No oropharyngeal exudate or posterior oropharyngeal erythema. Cardiovascular:      Rate and Rhythm: Normal rate and regular rhythm. Heart sounds: Normal heart sounds. No murmur heard. Pulmonary:      Effort: Pulmonary effort is normal. No respiratory distress. Breath sounds: Normal breath sounds. No stridor. No wheezing, rhonchi or rales. Musculoskeletal:      Cervical back: Neck supple. Lymphadenopathy:      Cervical: No cervical adenopathy. Neurological:      Mental Status: She is alert. Psychiatric:         Mood and Affect: Mood normal.         Behavior: Behavior normal.         Thought Content:  Thought content normal.         Judgment: Judgment normal.       Data:     Lab Results   Component Value Date/Time     01/03/2023 09:50 AM    K 4.1 01/03/2023 09:50 AM     01/03/2023 09:50 AM    CO2 27 01/03/2023 09:50 AM    BUN 17 01/03/2023 09:50 AM    CREATININE 1.04 01/03/2023 09:50 AM    GLUCOSE 90 01/03/2023 09:50 AM    PROT 7.3 04/13/2021 08:41 AM    LABALBU 3.9 04/13/2021 08:41 AM    BILITOT 0.4 04/13/2021 08:41 AM    ALKPHOS 92 04/13/2021 08:41 AM    AST 23 01/03/2023 09:50 AM    ALT 23 01/03/2023 09:50 AM     Lab Results   Component Value Date/Time    WBC 7.1 01/03/2023 09:50 AM    RBC 4.51 01/03/2023 09:50 AM    HGB 14.1 01/03/2023 09:50 AM    HCT 41.2 01/03/2023 09:50 AM    MCV 91.4 01/03/2023 09:50 AM    MCH 31.3 01/03/2023 09:50 AM    MCHC 34.2 01/03/2023 09:50 AM    RDW 12.4 01/03/2023 09:50 AM     01/03/2023 09:50 AM    MPV 10.9 01/03/2023 09:50 AM     Lab Results   Component Value Date/Time    TSH 23.2 01/03/2023 09:50 AM     Lab Results   Component Value Date/Time    CHOL 224 01/03/2023 09:50 AM    CHOL 188 07/01/2015 12:00 AM    HDL 60 01/03/2023 09:50 AM    LABA1C 5.5 01/03/2023 09:50 AM       Assessment/Plan:      Diagnosis Orders   1. Hyperlipidemia, unspecified hyperlipidemia type  Lipid Panel      2. Anxiety  ALPRAZolam (XANAX) 0.25 MG tablet      3. Other specified hypothyroidism  TSH With Reflex Ft4    TSH With Reflex Ft4      4. Essential hypertension  Comprehensive Metabolic Panel    CBC      5. Vitamin D deficiency  Vitamin D 25 Hydroxy      6. Other fatigue  Vitamin B12      7. Breast cancer screening by mammogram  [facility] DIGITAL SCREEN W OR WO CAD BILATERAL        Hypothyroidism:  - Reviewed blood work 1/3/2023. TSH 23.2. Free T4 1.05  -- Increase levothyroxine from 100mcg to 125mcg once daily  -- Repeat TFTs 6 weeks    Hypertension:   -- Uncontrolled   - Continue losartan 100 mg daily  - Increase hydrochlorothiazide from 12.5mg to 25 mg once daily.   Patient's insurance will not cover losartan-hydrochlorothiazide combination pill  - Reviewed lifestyle modifications to assist with lowering blood pressure including weight loss, healthy diet, exercising routinely, low-sodium diet, limiting caffeine and alcohol, and encouraging stress relief techniques. Hyperlipidemia:  - Reviewed lipid panel 1/2023 including total cholesterol 224, triglycerides 197,   - Discontinue simvastatin 40 mg daily  - Initiate atorvastatin 40 mg daily. Reviewed side effects such as muscle aches. If becomes severe notify office  - Counseled on low-cholesterol diet and routine physical activity  - Patient has strong family history of HLD  -- Repeat lipid panel 3 months     Completed Refills   Requested Prescriptions     Signed Prescriptions Disp Refills    butalbital-acetaminophen-caffeine (FIORICET, ESGIC) -40 MG per tablet 30 tablet 2     Sig: TAKE 1 TABLET BY MOUTH EVERY 4 HOURS AS NEEDED FOR HEADACHE. DO NOT EXCEED 6 TABLETS DAILY.     ALPRAZolam (XANAX) 0.25 MG tablet 30 tablet 0     Sig: Take 1/2 tablet by mouth once daily as needed for anxiety    levothyroxine (SYNTHROID) 125 MCG tablet 30 tablet 11     Sig: Take 1 tablet by mouth daily    hydroCHLOROthiazide (HYDRODIURIL) 25 MG tablet 30 tablet 11     Sig: Take 1 tablet by mouth every morning    atorvastatin (LIPITOR) 40 MG tablet 30 tablet 11     Sig: Take 1 tablet by mouth daily       Orders Placed This Encounter   Procedures    [facility] DIGITAL SCREEN W OR WO CAD BILATERAL     Standing Status:   Future     Standing Expiration Date:   3/9/2024    TSH With Reflex Ft4     Standing Status:   Future     Standing Expiration Date:   1/9/2024    Lipid Panel     Standing Status:   Future     Standing Expiration Date:   1/9/2024    Comprehensive Metabolic Panel     Standing Status:   Future     Standing Expiration Date:   1/9/2024    CBC     Standing Status:   Future     Standing Expiration Date:   1/9/2024    TSH With Reflex Ft4     Standing Status: Future     Standing Expiration Date:   1/9/2024    Vitamin D 25 Hydroxy     Standing Status:   Future     Standing Expiration Date:   1/9/2024    Vitamin B12     Standing Status:   Future     Standing Expiration Date:   1/9/2024        No results found for this visit on 01/09/23. Return in 3 months (on 4/9/2023), or if symptoms worsen or fail to improve, for wellness + 3 month f/u.     Electronically signed by KAREN Lal CNP on 01/11/23 at 7:56 AM.

## 2023-01-09 NOTE — PATIENT INSTRUCTIONS
Increase levothyroxine from 100mcg to 125mcg once daily  Continue losartan 100mg once daily  Increase hydrochlorothiazide from 12.5mg to 25mg once daily   Discontinue simvastatin 40mg once daily  Start atorvastatin 40mg once daily     SURVEY:    You may be receiving a survey from [employer] regarding your visit today. Please complete the survey to enable us to provide the highest quality of care to you and your family. If you cannot score us a very good on any question, please call the office to discuss how we could of made your experience a very good one. Thank you.

## 2023-02-02 RX ORDER — LEVOTHYROXINE SODIUM 0.12 MG/1
TABLET ORAL
Qty: 90 TABLET | Refills: 3 | Status: SHIPPED | OUTPATIENT
Start: 2023-02-02 | End: 2023-02-21 | Stop reason: DRUGHIGH

## 2023-02-02 RX ORDER — ATORVASTATIN CALCIUM 40 MG/1
TABLET, FILM COATED ORAL
Qty: 90 TABLET | Refills: 3 | Status: SHIPPED | OUTPATIENT
Start: 2023-02-02

## 2023-02-02 RX ORDER — HYDROCHLOROTHIAZIDE 25 MG/1
TABLET ORAL
Qty: 90 TABLET | Refills: 3 | Status: SHIPPED | OUTPATIENT
Start: 2023-02-02

## 2023-02-16 RX ORDER — LOSARTAN POTASSIUM 100 MG/1
TABLET ORAL
Qty: 90 TABLET | Refills: 1 | Status: SHIPPED | OUTPATIENT
Start: 2023-02-16

## 2023-02-20 ENCOUNTER — NURSE ONLY (OUTPATIENT)
Dept: PRIMARY CARE CLINIC | Age: 53
End: 2023-02-20

## 2023-02-20 ENCOUNTER — HOSPITAL ENCOUNTER (OUTPATIENT)
Dept: WOMENS IMAGING | Age: 53
Discharge: HOME | End: 2023-02-22
Payer: COMMERCIAL

## 2023-02-20 ENCOUNTER — ANCILLARY ORDERS (OUTPATIENT)
Dept: PRIMARY CARE CLINIC | Age: 53
End: 2023-02-20

## 2023-02-20 DIAGNOSIS — Z12.31 BREAST CANCER SCREENING BY MAMMOGRAM: ICD-10-CM

## 2023-02-20 DIAGNOSIS — R92.8 ABNORMAL MAMMOGRAM OF LEFT BREAST: ICD-10-CM

## 2023-02-20 DIAGNOSIS — N64.89 BREAST ASYMMETRY: Primary | ICD-10-CM

## 2023-02-20 PROCEDURE — 77067 SCR MAMMO BI INCL CAD: CPT

## 2023-02-22 NOTE — PROGRESS NOTES
Patient requesting to have ears quickly evaluated without visit as she is currently at the hospital for testing and has been having discomfort to the right ear. HEENT exam unremarkable aside from left TM with mild bulging without erythema as well as right ear canal with mild erythema/irritation at 6:00. No concern for AOM or need for antibiotics at this time. She was encouraged to use decongestants.   Follow-up for formal visit if symptoms persist.

## 2023-03-10 RX ORDER — MIRTAZAPINE 45 MG/1
45 TABLET, FILM COATED ORAL NIGHTLY
Qty: 90 TABLET | Refills: 0 | Status: SHIPPED | OUTPATIENT
Start: 2023-03-10

## 2023-03-13 ENCOUNTER — APPOINTMENT (OUTPATIENT)
Dept: ULTRASOUND IMAGING | Age: 53
End: 2023-03-13
Payer: COMMERCIAL

## 2023-03-13 ENCOUNTER — HOSPITAL ENCOUNTER (OUTPATIENT)
Dept: WOMENS IMAGING | Age: 53
Discharge: HOME | End: 2023-03-15
Payer: COMMERCIAL

## 2023-03-13 DIAGNOSIS — N64.89 BREAST ASYMMETRY: ICD-10-CM

## 2023-03-13 DIAGNOSIS — R92.8 ABNORMAL MAMMOGRAM OF LEFT BREAST: ICD-10-CM

## 2023-03-13 PROCEDURE — 77065 DX MAMMO INCL CAD UNI: CPT

## 2023-03-17 RX ORDER — LEVOTHYROXINE SODIUM 137 UG/1
TABLET ORAL
Qty: 30 TABLET | Refills: 5 | Status: SHIPPED | OUTPATIENT
Start: 2023-03-17

## 2023-03-18 LAB — PAP SMEAR, EXTERNAL: NORMAL

## 2023-04-14 ENCOUNTER — OFFICE VISIT (OUTPATIENT)
Dept: PRIMARY CARE CLINIC | Age: 53
End: 2023-04-14
Payer: COMMERCIAL

## 2023-04-14 VITALS
SYSTOLIC BLOOD PRESSURE: 116 MMHG | HEART RATE: 88 BPM | HEIGHT: 67 IN | DIASTOLIC BLOOD PRESSURE: 80 MMHG | WEIGHT: 190 LBS | TEMPERATURE: 97 F | RESPIRATION RATE: 18 BRPM | BODY MASS INDEX: 29.82 KG/M2 | OXYGEN SATURATION: 98 %

## 2023-04-14 DIAGNOSIS — E55.9 VITAMIN D DEFICIENCY: ICD-10-CM

## 2023-04-14 DIAGNOSIS — R73.09 ELEVATED GLUCOSE: ICD-10-CM

## 2023-04-14 DIAGNOSIS — E53.8 B12 DEFICIENCY: ICD-10-CM

## 2023-04-14 DIAGNOSIS — E03.8 OTHER SPECIFIED HYPOTHYROIDISM: ICD-10-CM

## 2023-04-14 DIAGNOSIS — I10 ESSENTIAL HYPERTENSION: ICD-10-CM

## 2023-04-14 DIAGNOSIS — K59.00 CONSTIPATION, UNSPECIFIED CONSTIPATION TYPE: ICD-10-CM

## 2023-04-14 DIAGNOSIS — F32.4 MAJOR DEPRESSIVE DISORDER IN PARTIAL REMISSION, UNSPECIFIED WHETHER RECURRENT (HCC): ICD-10-CM

## 2023-04-14 DIAGNOSIS — Z00.00 WELLNESS EXAMINATION: Primary | ICD-10-CM

## 2023-04-14 DIAGNOSIS — E78.5 HYPERLIPIDEMIA, UNSPECIFIED HYPERLIPIDEMIA TYPE: ICD-10-CM

## 2023-04-14 LAB — HBA1C MFR BLD: 5.6 %

## 2023-04-14 PROCEDURE — 99213 OFFICE O/P EST LOW 20 MIN: CPT | Performed by: NURSE PRACTITIONER

## 2023-04-14 PROCEDURE — 99396 PREV VISIT EST AGE 40-64: CPT | Performed by: NURSE PRACTITIONER

## 2023-04-14 PROCEDURE — 3074F SYST BP LT 130 MM HG: CPT | Performed by: NURSE PRACTITIONER

## 2023-04-14 PROCEDURE — 3078F DIAST BP <80 MM HG: CPT | Performed by: NURSE PRACTITIONER

## 2023-04-14 PROCEDURE — 83036 HEMOGLOBIN GLYCOSYLATED A1C: CPT | Performed by: NURSE PRACTITIONER

## 2023-04-14 RX ORDER — DOCUSATE SODIUM 100 MG/1
100 CAPSULE, LIQUID FILLED ORAL 2 TIMES DAILY PRN
Qty: 60 CAPSULE | Refills: 3 | Status: SHIPPED | OUTPATIENT
Start: 2023-04-14 | End: 2023-05-14

## 2023-04-14 RX ORDER — LANOLIN ALCOHOL/MO/W.PET/CERES
1000 CREAM (GRAM) TOPICAL DAILY
Qty: 90 TABLET | Refills: 3 | Status: SHIPPED | OUTPATIENT
Start: 2023-04-14 | End: 2023-04-14 | Stop reason: CLARIF

## 2023-04-14 SDOH — ECONOMIC STABILITY: FOOD INSECURITY: WITHIN THE PAST 12 MONTHS, YOU WORRIED THAT YOUR FOOD WOULD RUN OUT BEFORE YOU GOT MONEY TO BUY MORE.: NEVER TRUE

## 2023-04-14 SDOH — ECONOMIC STABILITY: INCOME INSECURITY: HOW HARD IS IT FOR YOU TO PAY FOR THE VERY BASICS LIKE FOOD, HOUSING, MEDICAL CARE, AND HEATING?: NOT HARD AT ALL

## 2023-04-14 SDOH — ECONOMIC STABILITY: FOOD INSECURITY: WITHIN THE PAST 12 MONTHS, THE FOOD YOU BOUGHT JUST DIDN'T LAST AND YOU DIDN'T HAVE MONEY TO GET MORE.: NEVER TRUE

## 2023-04-14 SDOH — ECONOMIC STABILITY: HOUSING INSECURITY
IN THE LAST 12 MONTHS, WAS THERE A TIME WHEN YOU DID NOT HAVE A STEADY PLACE TO SLEEP OR SLEPT IN A SHELTER (INCLUDING NOW)?: NO

## 2023-04-14 ASSESSMENT — ENCOUNTER SYMPTOMS
SHORTNESS OF BREATH: 0
SORE THROAT: 0
ABDOMINAL PAIN: 0
DIARRHEA: 0
SINUS PAIN: 0
SINUS PRESSURE: 0
RHINORRHEA: 1
CONSTIPATION: 1
WHEEZING: 0
COUGH: 0

## 2023-05-04 ENCOUNTER — HOSPITAL ENCOUNTER (OUTPATIENT)
Dept: NUTRITION | Age: 53
Discharge: HOME | End: 2023-05-04
Payer: COMMERCIAL

## 2023-05-04 VITALS — BODY MASS INDEX: 30.96 KG/M2 | HEIGHT: 67 IN | WEIGHT: 197.25 LBS

## 2023-05-04 PROCEDURE — 97802 MEDICAL NUTRITION INDIV IN: CPT

## 2023-05-04 NOTE — PROGRESS NOTES
MNT provided for ***    {NUTRITION PROBLEM LIST:88906::\"***\"} related to {NUTRITION ETIOLOGY LIST:05249::\"***\"} as evidenced by {NUTRITION SIGNS SYMPTOMS:21037::\"***\"}    Client data    Ht: ***\" Wt: ***# BMI: *** (***)   Weight changes: *** CBW: *** kg   BMR: *** calories Est. total calorie needs: ~***       Client overall goal for weight is for ***. Current eating pattern is ***. Use of whole grains, whole fruits and vegetables appear *** than recommended quantities. There is an excess of *** per recall. Activity is ***. Client presents for MNT today with ***. Client was educated on carbohydrate counting with the following goals at meals and snacks:  Breakfast: *** grams  Lunch: *** grams  Supper: *** grams  Bedtime Snack: *** grams    Client received information on limiting fat in diet to lessen heart disease risk, with goals of: Total Fat: *** grams  Saturated Fat: *** grams  Trans Fat: 0 grams daily     Discussed and provided literature on:  ***    Client goals:  ***    Expected compliance:  ***  Client appears to be in {desc; stages of change:26989} phase of change. Recommend follow up as needed. RD name and phone number provided. Thank you for the referral.    Electronically signed by Shana Prajapati RD, SALUD on 5/4/2023 at 1:41 PM    Education session duration: *** minutes.

## 2023-06-09 RX ORDER — MIRTAZAPINE 45 MG/1
45 TABLET, FILM COATED ORAL NIGHTLY
Qty: 90 TABLET | Refills: 3 | Status: SHIPPED | OUTPATIENT
Start: 2023-06-09

## 2023-07-03 RX ORDER — FENOFIBRATE 54 MG/1
TABLET ORAL
Qty: 90 TABLET | Refills: 3 | Status: SHIPPED | OUTPATIENT
Start: 2023-07-03 | End: 2023-08-10

## 2023-08-04 ENCOUNTER — HOSPITAL ENCOUNTER (OUTPATIENT)
Age: 53
Discharge: HOME | End: 2023-08-04
Payer: COMMERCIAL

## 2023-08-04 DIAGNOSIS — I10 ESSENTIAL HYPERTENSION: ICD-10-CM

## 2023-08-04 DIAGNOSIS — E03.8 OTHER SPECIFIED HYPOTHYROIDISM: ICD-10-CM

## 2023-08-04 DIAGNOSIS — E78.5 HYPERLIPIDEMIA, UNSPECIFIED HYPERLIPIDEMIA TYPE: ICD-10-CM

## 2023-08-04 DIAGNOSIS — R73.09 ELEVATED GLUCOSE: ICD-10-CM

## 2023-08-04 DIAGNOSIS — E55.9 VITAMIN D DEFICIENCY: ICD-10-CM

## 2023-08-04 DIAGNOSIS — E53.8 B12 DEFICIENCY: ICD-10-CM

## 2023-08-04 LAB
25(OH)D3 SERPL-MCNC: 39.2 NG/ML
ALBUMIN SERPL-MCNC: 5.1 G/DL (ref 3.5–5.2)
ALBUMIN/GLOB SERPL: 1.8 {RATIO} (ref 1–2.5)
ALP SERPL-CCNC: 84 U/L (ref 35–104)
ALT SERPL-CCNC: 28 U/L (ref 5–33)
ANION GAP SERPL CALCULATED.3IONS-SCNC: 12 MMOL/L (ref 9–17)
AST SERPL-CCNC: 25 U/L
BILIRUB SERPL-MCNC: 0.4 MG/DL (ref 0.3–1.2)
BUN SERPL-MCNC: 19 MG/DL (ref 6–20)
BUN/CREAT SERPL: 24 (ref 9–20)
CALCIUM SERPL-MCNC: 10.1 MG/DL (ref 8.6–10.4)
CHLORIDE SERPL-SCNC: 104 MMOL/L (ref 98–107)
CHOLEST SERPL-MCNC: 182 MG/DL
CHOLESTEROL/HDL RATIO: 3.4
CO2 SERPL-SCNC: 26 MMOL/L (ref 20–31)
CREAT SERPL-MCNC: 0.8 MG/DL (ref 0.5–0.9)
EST. AVERAGE GLUCOSE BLD GHB EST-MCNC: 108 MG/DL
GFR SERPL CREATININE-BSD FRML MDRD: >60 ML/MIN/1.73M2
GLUCOSE SERPL-MCNC: 121 MG/DL (ref 70–99)
HBA1C MFR BLD: 5.4 % (ref 4–6)
HDLC SERPL-MCNC: 53 MG/DL
LDLC SERPL CALC-MCNC: 92 MG/DL (ref 0–130)
POTASSIUM SERPL-SCNC: 3.8 MMOL/L (ref 3.7–5.3)
PROT SERPL-MCNC: 7.9 G/DL (ref 6.4–8.3)
SODIUM SERPL-SCNC: 142 MMOL/L (ref 135–144)
TRIGL SERPL-MCNC: 185 MG/DL
TSH SERPL DL<=0.05 MIU/L-ACNC: 0.64 UIU/ML (ref 0.3–5)
VIT B12 SERPL-MCNC: 705 PG/ML (ref 232–1245)

## 2023-08-04 PROCEDURE — 80053 COMPREHEN METABOLIC PANEL: CPT

## 2023-08-04 PROCEDURE — 80061 LIPID PANEL: CPT

## 2023-08-04 PROCEDURE — 36415 COLL VENOUS BLD VENIPUNCTURE: CPT

## 2023-08-04 PROCEDURE — 83036 HEMOGLOBIN GLYCOSYLATED A1C: CPT

## 2023-08-04 PROCEDURE — 82306 VITAMIN D 25 HYDROXY: CPT

## 2023-08-04 PROCEDURE — 82607 VITAMIN B-12: CPT

## 2023-08-04 PROCEDURE — 84443 ASSAY THYROID STIM HORMONE: CPT

## 2023-08-10 ENCOUNTER — OFFICE VISIT (OUTPATIENT)
Dept: PRIMARY CARE CLINIC | Age: 53
End: 2023-08-10

## 2023-08-10 VITALS
DIASTOLIC BLOOD PRESSURE: 88 MMHG | BODY MASS INDEX: 30.38 KG/M2 | TEMPERATURE: 98.6 F | WEIGHT: 194 LBS | HEART RATE: 106 BPM | RESPIRATION RATE: 96 BRPM | SYSTOLIC BLOOD PRESSURE: 131 MMHG

## 2023-08-10 DIAGNOSIS — F41.9 ANXIETY: ICD-10-CM

## 2023-08-10 DIAGNOSIS — F32.4 MAJOR DEPRESSIVE DISORDER IN PARTIAL REMISSION, UNSPECIFIED WHETHER RECURRENT (HCC): ICD-10-CM

## 2023-08-10 DIAGNOSIS — K59.00 CONSTIPATION, UNSPECIFIED CONSTIPATION TYPE: Primary | ICD-10-CM

## 2023-08-10 RX ORDER — FENOFIBRATE 54 MG/1
108 TABLET ORAL DAILY
Qty: 90 TABLET | Refills: 3
Start: 2023-08-10

## 2023-08-10 RX ORDER — ALPRAZOLAM 0.25 MG/1
TABLET ORAL
Qty: 30 TABLET | Refills: 0 | Status: SHIPPED | OUTPATIENT
Start: 2023-08-10 | End: 2023-09-10

## 2023-08-10 ASSESSMENT — ENCOUNTER SYMPTOMS: CONSTIPATION: 1

## 2023-08-10 NOTE — PROGRESS NOTES
Constipation, unspecified constipation type  Abbi Jimenes MD, Gastroenterology, [facility]      2. Major depressive disorder in partial remission, unspecified whether recurrent ([address])  Amb External Referral To Psychiatry      3. Anxiety  Amb External Referral To Psychiatry    ALPRAZolam (XANAX) 0.25 MG tablet        Anxiety, depression:  - Current treatment includes xanax 0.25mg PRN, mirtazapine 45mg QD, cymbalta 30mg QD, Zoloft 100mg QD  - Continue all medications as prescribed  - Patient inquiring about psychiatry referral to assess current medications, I am agreeable. Referral to [facility] psychiatry placed today  -- Xanax refill placed    Constipation:  - Continue remaining well-hydrated with water, high-fiber diet, routine exercise  - Patient has trialed several OTC medications such as MiraLAX, Colace, fiber capsules with minimal benefit  - Chronic  - Offer rx for Linzess, patient is hesitant to add another medication to her regimen  - Patient is agreeable to referral to [facility], GI for further evaluation of constipation  - Most recent colonoscopy 2021 with recommended repeat 10 years     Elevated TG:   -- Reviewed lab work 8/2023  -- Improving, though still elevated.    - Increase fenofibrate 54 mg from 1 tablet daily to 2 tablets daily  - Continue atorvastatin 40mg QD   -- Counseled on low cholesterol diet and routine exercise     -- Reviewed emergent signs and symptoms and when to seek care at the emergency department and/or call 911     Completed Refills   Requested Prescriptions     Signed Prescriptions Disp Refills    ALPRAZolam (XANAX) 0.25 MG tablet 30 tablet 0     Sig: Take 1/2 tablet by mouth once daily as needed for anxiety       Orders Placed This Encounter   Procedures    Abbi Jimenes MD, Gastroenterology, [facility]     Referral Priority:   Routine     Referral Type:   Eval and Treat     Referral Reason:   Specialty Services Required     Referred to Provider:   Chrissie Pepper MD

## 2023-08-10 NOTE — PATIENT INSTRUCTIONS
SURVEY:    You may be receiving a survey from [employer] regarding your visit today. Please complete the survey to enable us to provide the highest quality of care to you and your family. If you cannot score us a very good on any question, please call the office to discuss how we could of made your experience a very good one. Thank you.

## 2023-08-15 RX ORDER — LOSARTAN POTASSIUM 100 MG/1
TABLET ORAL
Qty: 90 TABLET | Refills: 1 | Status: SHIPPED | OUTPATIENT
Start: 2023-08-15

## 2023-09-11 RX ORDER — LEVOTHYROXINE SODIUM 137 UG/1
TABLET ORAL
Qty: 90 TABLET | Refills: 3 | Status: SHIPPED | OUTPATIENT
Start: 2023-09-11

## 2023-09-26 ENCOUNTER — PATIENT MESSAGE (OUTPATIENT)
Dept: PRIMARY CARE CLINIC | Age: 53
End: 2023-09-26

## 2023-09-26 RX ORDER — FENOFIBRATE 145 MG/1
145 TABLET, COATED ORAL DAILY
Qty: 90 TABLET | Refills: 0 | Status: SHIPPED | OUTPATIENT
Start: 2023-09-26

## 2023-09-26 RX ORDER — FENOFIBRATE 54 MG/1
108 TABLET ORAL DAILY
Qty: 90 TABLET | Refills: 3 | Status: SHIPPED | OUTPATIENT
Start: 2023-09-26

## 2023-09-26 NOTE — TELEPHONE ENCOUNTER
----- Message from Mila Dunlap sent at 9/26/2023  4:44 PM EDT -----  Subject: Refill Request    QUESTIONS  Name of Medication? fenofibrate (TRICOR) 54 MG tablet  Patient-reported dosage and instructions? Doubled dosage at last   appointment. How many days do you have left? 0  Preferred Pharmacy? [id number]  Pharmacy phone number (if available)? [phone]  ---------------------------------------------------------------------------  --------------  CALL BACK INFO  What is the best way for the office to contact you? OK to leave message on   voicemail,OK to respond with electronic message via [employer] portal (only   for patients who have registered [employer] account)  Preferred Call Back Phone Number? [phone]  ---------------------------------------------------------------------------  --------------  SCRIPT ANSWERS  Relationship to Patient?  Self

## 2023-09-26 NOTE — TELEPHONE ENCOUNTER
From: Ava Fuentes  To: Martín Mueller  Sent: 9/26/2023 12:42 PM EDT  Subject: Out of FenoFibrate 54mg    Dominic Leary:    I am out of Fenofibrate 54 mg. As a reminder, at my last appointment, we upped my dosage in excess of 108 mg, but agreed that I could take 2 of the 54mg tablets until my supply ran out. I am now out. I apologize, for the delay in contacting you. I have refills available, but I wanted to check if we need to represcribe for the higher dosage you originally recommended.      Thank you for your help,    [id number]

## 2023-09-28 RX ORDER — FENOFIBRATE 67 MG/1
67 CAPSULE ORAL
Qty: 30 CAPSULE | Refills: 5 | Status: SHIPPED | OUTPATIENT
Start: 2023-09-28 | End: 2024-09-22

## 2023-09-28 NOTE — TELEPHONE ENCOUNTER
Her insurance only covers Fenofibrate 54 mg tab once daily RX written for BID     Alternatives covered are   Fenofibrate 43 mg cap  Fenofibrate 48 mg tab   Fenofibrate 50 mg cap   Fenofibrate 67 mg cap  Fenofibric acid dr 45 mg cap    How would you like to proceed ?

## 2023-10-02 RX ORDER — FENOFIBRATE 67 MG/1
67 CAPSULE ORAL
Qty: 30 CAPSULE | Refills: 5 | OUTPATIENT
Start: 2023-10-02 | End: 2024-09-26

## 2023-10-04 DIAGNOSIS — F33.9 EPISODE OF RECURRENT MAJOR DEPRESSIVE DISORDER, UNSPECIFIED DEPRESSION EPISODE SEVERITY (HCC): ICD-10-CM

## 2023-10-04 DIAGNOSIS — F32.4 MAJOR DEPRESSIVE DISORDER IN PARTIAL REMISSION, UNSPECIFIED WHETHER RECURRENT (HCC): ICD-10-CM

## 2023-10-04 RX ORDER — DULOXETIN HYDROCHLORIDE 30 MG/1
CAPSULE, DELAYED RELEASE ORAL DAILY
Qty: 90 CAPSULE | Refills: 2 | Status: SHIPPED | OUTPATIENT
Start: 2023-10-04

## 2023-11-09 ENCOUNTER — OFFICE VISIT (OUTPATIENT)
Dept: GASTROENTEROLOGY | Age: 53
End: 2023-11-09
Payer: COMMERCIAL

## 2023-11-09 VITALS
HEIGHT: 67 IN | HEART RATE: 97 BPM | WEIGHT: 194 LBS | DIASTOLIC BLOOD PRESSURE: 86 MMHG | SYSTOLIC BLOOD PRESSURE: 121 MMHG | BODY MASS INDEX: 30.45 KG/M2

## 2023-11-09 DIAGNOSIS — K59.00 CONSTIPATION, UNSPECIFIED CONSTIPATION TYPE: Primary | ICD-10-CM

## 2023-11-09 PROCEDURE — 99203 OFFICE O/P NEW LOW 30 MIN: CPT | Performed by: NURSE PRACTITIONER

## 2023-11-09 PROCEDURE — 3078F DIAST BP <80 MM HG: CPT | Performed by: NURSE PRACTITIONER

## 2023-11-09 PROCEDURE — 3074F SYST BP LT 130 MM HG: CPT | Performed by: NURSE PRACTITIONER

## 2023-11-09 RX ORDER — SIMVASTATIN 40 MG
40 TABLET ORAL NIGHTLY
COMMUNITY
Start: 2020-12-19

## 2023-11-09 RX ORDER — IBUPROFEN 600 MG/1
600 TABLET ORAL EVERY 6 HOURS PRN
COMMUNITY
Start: 2023-09-26

## 2023-11-09 RX ORDER — ALPRAZOLAM 0.25 MG/1
0.25 TABLET ORAL 2 TIMES DAILY
COMMUNITY
Start: 2023-09-15

## 2023-11-09 RX ORDER — GLYCERIN/MIN OIL/POLYCARBOPHIL
500 GEL WITH APPLICATOR (GRAM) VAGINAL EVERY 6 HOURS PRN
COMMUNITY
Start: 2023-09-26
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
## 2023-11-10 ENCOUNTER — OFFICE VISIT (OUTPATIENT)
Dept: PRIMARY CARE CLINIC | Age: 53
End: 2023-11-10

## 2023-11-10 ENCOUNTER — HOSPITAL ENCOUNTER (OUTPATIENT)
Dept: INFUSION THERAPY | Age: 53
Discharge: HOME | End: 2023-11-10
Payer: COMMERCIAL

## 2023-11-10 VITALS
OXYGEN SATURATION: 96 % | HEIGHT: 67 IN | BODY MASS INDEX: 30.92 KG/M2 | TEMPERATURE: 98.2 F | SYSTOLIC BLOOD PRESSURE: 130 MMHG | HEART RATE: 115 BPM | DIASTOLIC BLOOD PRESSURE: 84 MMHG | RESPIRATION RATE: 18 BRPM | WEIGHT: 197 LBS

## 2023-11-10 VITALS
SYSTOLIC BLOOD PRESSURE: 110 MMHG | RESPIRATION RATE: 20 BRPM | TEMPERATURE: 98.8 F | HEART RATE: 108 BPM | DIASTOLIC BLOOD PRESSURE: 90 MMHG

## 2023-11-10 DIAGNOSIS — J10.1 INFLUENZA A: Primary | ICD-10-CM

## 2023-11-10 DIAGNOSIS — R05.1 ACUTE COUGH: ICD-10-CM

## 2023-11-10 LAB
INFLUENZA A ANTIBODY: ABNORMAL
INFLUENZA B ANTIBODY: ABNORMAL
Lab: ABNORMAL
PERFORMING INSTRUMENT: ABNORMAL
QC PASS/FAIL: ABNORMAL
SARS-COV-2, POC: ABNORMAL

## 2023-11-10 PROCEDURE — 96361 HYDRATE IV INFUSION ADD-ON: CPT

## 2023-11-10 PROCEDURE — 96360 HYDRATION IV INFUSION INIT: CPT

## 2023-11-10 PROCEDURE — 2580000003 HC RX 258: Performed by: NURSE PRACTITIONER

## 2023-11-10 RX ORDER — SODIUM CHLORIDE 0.9 % (FLUSH) 0.9 %
5-40 SYRINGE (ML) INJECTION PRN
Status: DISCONTINUED | OUTPATIENT
Start: 2023-11-10 | End: 2023-11-11 | Stop reason: HOSPADM

## 2023-11-10 RX ORDER — SODIUM CHLORIDE 9 MG/ML
10 INJECTION INTRAVENOUS PRN
Status: DISCONTINUED | OUTPATIENT
Start: 2023-11-10 | End: 2023-11-10

## 2023-11-10 RX ORDER — OSELTAMIVIR PHOSPHATE 75 MG/1
75 CAPSULE ORAL 2 TIMES DAILY
Qty: 10 CAPSULE | Refills: 0 | Status: SHIPPED | OUTPATIENT
Start: 2023-11-10 | End: 2023-11-15

## 2023-11-10 RX ORDER — 0.9 % SODIUM CHLORIDE 0.9 %
1000 INTRAVENOUS SOLUTION INTRAVENOUS ONCE
Status: COMPLETED | OUTPATIENT
Start: 2023-11-10 | End: 2023-11-10

## 2023-11-10 RX ORDER — 0.9 % SODIUM CHLORIDE 0.9 %
1000 INTRAVENOUS SOLUTION INTRAVENOUS ONCE
Status: DISCONTINUED | OUTPATIENT
Start: 2023-11-10 | End: 2023-11-10

## 2023-11-10 RX ADMIN — SODIUM CHLORIDE 1000 ML: 9 INJECTION, SOLUTION INTRAVENOUS at 14:59

## 2023-11-10 RX ADMIN — SODIUM CHLORIDE, PRESERVATIVE FREE 10 ML: 5 INJECTION INTRAVENOUS at 14:55

## 2023-11-10 ASSESSMENT — ENCOUNTER SYMPTOMS
NAUSEA: 1
WHEEZING: 0
DIARRHEA: 1
SHORTNESS OF BREATH: 0
COUGH: 1

## 2023-11-10 NOTE — PATIENT INSTRUCTIONS
SURVEY:    You may be receiving a survey from [email or web address] regarding your visit today. Please complete the survey to enable us to provide the highest quality of care to you and your family. If you cannot score us a very good on any question, please call the office to discuss how we could have made your experience a very good one. Thank you.

## 2023-11-10 NOTE — PROGRESS NOTES
normal.         Judgment: Judgment normal.         Data:     Lab Results   Component Value Date/Time     08/04/2023 09:25 AM    K 3.8 08/04/2023 09:25 AM     08/04/2023 09:25 AM    CO2 26 08/04/2023 09:25 AM    BUN 19 08/04/2023 09:25 AM    CREATININE 0.8 08/04/2023 09:25 AM    GLUCOSE 121 08/04/2023 09:25 AM    GLUCOSE 105 04/11/2023 07:55 AM    PROT 7.9 08/04/2023 09:25 AM    LABALBU 5.1 08/04/2023 09:25 AM    BILITOT 0.4 08/04/2023 09:25 AM    ALKPHOS 84 08/04/2023 09:25 AM    AST 25 08/04/2023 09:25 AM    ALT 28 08/04/2023 09:25 AM     Lab Results   Component Value Date/Time    WBC 6.5 04/11/2023 07:55 AM    RBC 4.41 04/11/2023 07:55 AM    HGB 13.7 04/11/2023 07:55 AM    HCT 39.5 04/11/2023 07:55 AM    MCV 89.6 04/11/2023 07:55 AM    MCH 31.1 04/11/2023 07:55 AM    MCHC 34.7 04/11/2023 07:55 AM    RDW 11.6 04/11/2023 07:55 AM     04/11/2023 07:55 AM    MPV 11.0 04/11/2023 07:55 AM     Lab Results   Component Value Date/Time    TSH 0.64 08/04/2023 09:26 AM     Lab Results   Component Value Date/Time    CHOL 182 08/04/2023 09:26 AM    CHOL 188 07/01/2015 12:00 AM    HDL 53 08/04/2023 09:26 AM    LABA1C 5.4 08/04/2023 09:25 AM       Assessment/Plan:      Diagnosis Orders   1. Influenza A        2. Acute cough  POCT Influenza A/B    POCT COVID-19, Antigen        - POC covid negative   - POC influenza A positive   - Concern for dehydration. Mucous membranes dry.  Tachycardic   - Patient sent to [facility] outpatient department for 1 liter fluids   - Tamiflu BID x5 days ordered   - Continue rest, hydration, OTC medications for symptom relief   - Notify office if symptoms worsen/persist   - Reviewed emergent s/s and when to seek care at the ED     -- Reviewed emergent signs and symptoms and when to seek care at the emergency department and/or call 911     Completed Refills   Requested Prescriptions     Signed Prescriptions Disp Refills    oseltamivir (TAMIFLU) 75 MG capsule 10 capsule 0     Sig:

## 2023-11-12 ASSESSMENT — ENCOUNTER SYMPTOMS
CONSTIPATION: 1
DIARRHEA: 1

## 2023-11-13 ENCOUNTER — TELEPHONE (OUTPATIENT)
Dept: PRIMARY CARE CLINIC | Age: 53
End: 2023-11-13

## 2023-11-13 NOTE — TELEPHONE ENCOUNTER
Patient states that it was a rough weekend and fever broke on Sunday morning.  She states that since fever broke, she just feels tired and congested

## 2023-11-13 NOTE — TELEPHONE ENCOUNTER
Please call patient and inquire how she is feeling today.  She was diagnosed with influenza in office 11/10/23

## 2023-11-28 ENCOUNTER — OFFICE VISIT (OUTPATIENT)
Dept: PRIMARY CARE CLINIC | Age: 53
End: 2023-11-28
Payer: COMMERCIAL

## 2023-11-28 VITALS
WEIGHT: 192 LBS | HEIGHT: 67 IN | OXYGEN SATURATION: 99 % | SYSTOLIC BLOOD PRESSURE: 100 MMHG | BODY MASS INDEX: 30.13 KG/M2 | DIASTOLIC BLOOD PRESSURE: 70 MMHG | TEMPERATURE: 97.3 F | HEART RATE: 123 BPM

## 2023-11-28 DIAGNOSIS — R00.0 TACHYCARDIA: ICD-10-CM

## 2023-11-28 DIAGNOSIS — R06.02 SHORTNESS OF BREATH: Primary | ICD-10-CM

## 2023-11-28 PROCEDURE — 3078F DIAST BP <80 MM HG: CPT | Performed by: NURSE PRACTITIONER

## 2023-11-28 PROCEDURE — 99214 OFFICE O/P EST MOD 30 MIN: CPT | Performed by: NURSE PRACTITIONER

## 2023-11-28 PROCEDURE — 3074F SYST BP LT 130 MM HG: CPT | Performed by: NURSE PRACTITIONER

## 2023-11-28 RX ORDER — AZITHROMYCIN 250 MG/1
TABLET, FILM COATED ORAL
Qty: 6 TABLET | Refills: 0 | Status: SHIPPED | OUTPATIENT
Start: 2023-11-28

## 2023-11-28 RX ORDER — DULOXETIN HYDROCHLORIDE 30 MG/1
60 CAPSULE, DELAYED RELEASE ORAL
COMMUNITY
Start: 2023-11-22

## 2023-11-28 RX ORDER — ALBUTEROL SULFATE 90 UG/1
2 AEROSOL, METERED RESPIRATORY (INHALATION) EVERY 4 HOURS PRN
Qty: 18 G | Refills: 0 | Status: SHIPPED | OUTPATIENT
Start: 2023-11-28

## 2023-11-28 ASSESSMENT — ENCOUNTER SYMPTOMS
SHORTNESS OF BREATH: 1
COUGH: 1

## 2023-11-28 NOTE — PROGRESS NOTES
Name: Elton Bruno  : 1970         Chief Complaint:     Chief Complaint   Patient presents with    influenza follow up     -influenza follow up  -better than she was  -fever broke  -still feels exhausted  -dry cough giving her a headache  -sinus congestion  -she feels she needs to get stuff out of her chest but it is just a dry cough  -taking decongestants and they are helping  -having hot flashes never have before         History of Present Illness:      Elton Bruno is a 48 y.o.  female who presents with influenza follow up (-influenza follow up/-better than she was/-fever broke/-still feels exhausted/-dry cough giving her a headache/-sinus congestion/-she feels she needs to get stuff out of her chest but it is just a dry cough/-taking decongestants and they are helping/-having hot flashes never have before/)      HPI    Patient presents for influenza follow-up. She was seen in office 11/10/2023 when she tested positive for influenza A and was prescribed Tamiflu twice daily x 5 days and sent for outpatient IV fluids. Today, she states that symptoms have improved overall. However she is still continuing to feel exhausted. She has continuous dry cough that is causing headache. She has persistent sinus congestion. She is taking decongestants with no benefit. She is having hot flashes that she has never had in the past. She feels as though she is not getting enough oxygen. She admits feeling lightheaded. She is also feeling exhausted. She does wear a watch that tracks her heart rate and this shows resting heart rate of 95.      Past Medical History:     Past Medical History:   Diagnosis Date    Anxiety ongoing    Depression     Fibromyalgia     Headache     Hyperlipidemia     Hypertension     Hypothyroidism     Insomnia 2014    Irritable bowel syndrome     Sleep apnea       Reviewed all health maintenance requirements and ordered appropriate tests  Health Maintenance Due   Topic Date Due

## 2023-11-29 ENCOUNTER — HOSPITAL ENCOUNTER (OUTPATIENT)
Dept: GENERAL RADIOLOGY | Age: 53
Discharge: HOME | End: 2023-12-01
Payer: COMMERCIAL

## 2023-11-29 ENCOUNTER — HOSPITAL ENCOUNTER (OUTPATIENT)
Age: 53
Discharge: HOME | End: 2023-11-29
Payer: COMMERCIAL

## 2023-11-29 ENCOUNTER — HOSPITAL ENCOUNTER (OUTPATIENT)
Age: 53
Discharge: HOME | End: 2023-12-01
Payer: COMMERCIAL

## 2023-11-29 DIAGNOSIS — R06.02 SHORTNESS OF BREATH: ICD-10-CM

## 2023-11-29 DIAGNOSIS — R00.0 TACHYCARDIA: ICD-10-CM

## 2023-11-29 PROCEDURE — 71046 X-RAY EXAM CHEST 2 VIEWS: CPT

## 2023-11-29 PROCEDURE — 93005 ELECTROCARDIOGRAM TRACING: CPT

## 2023-11-30 LAB
EKG ATRIAL RATE: 103 BPM
EKG P AXIS: 51 DEGREES
EKG P-R INTERVAL: 130 MS
EKG Q-T INTERVAL: 350 MS
EKG QRS DURATION: 86 MS
EKG QTC CALCULATION (BAZETT): 458 MS
EKG R AXIS: 54 DEGREES
EKG T AXIS: 28 DEGREES
EKG VENTRICULAR RATE: 103 BPM

## 2023-11-30 PROCEDURE — 93010 ELECTROCARDIOGRAM REPORT: CPT | Performed by: INTERNAL MEDICINE

## 2023-12-29 RX ORDER — FENOFIBRATE 145 MG/1
145 TABLET, COATED ORAL DAILY
Qty: 90 TABLET | Refills: 3 | Status: SHIPPED | OUTPATIENT
Start: 2023-12-29

## 2024-01-02 RX ORDER — FLUTICASONE PROPIONATE 110 UG/1
1 AEROSOL, METERED RESPIRATORY (INHALATION) 2 TIMES DAILY
Qty: 12 G | Refills: 0 | Status: SHIPPED | OUTPATIENT
Start: 2024-01-02

## 2024-01-17 ENCOUNTER — HOSPITAL ENCOUNTER (OUTPATIENT)
Age: 54
Discharge: HOME | End: 2024-01-19
Payer: COMMERCIAL

## 2024-01-17 VITALS
WEIGHT: 190.04 LBS | SYSTOLIC BLOOD PRESSURE: 120 MMHG | BODY MASS INDEX: 29.83 KG/M2 | DIASTOLIC BLOOD PRESSURE: 90 MMHG | HEIGHT: 67 IN

## 2024-01-17 DIAGNOSIS — R00.0 TACHYCARDIA: ICD-10-CM

## 2024-01-17 LAB
ECHO AO ROOT DIAM: 3 CM
ECHO AO ROOT INDEX: 1.52 CM/M2
ECHO AV ACCELERATION TIME: 99.45 MS
ECHO AV CUSP MM: 1.9 CM
ECHO AV MEAN GRADIENT: 4 MMHG
ECHO AV MEAN VELOCITY: 0.9 M/S
ECHO AV PEAK VELOCITY: 1.3 M/S
ECHO AV VTI: 27.6 CM
ECHO BSA: 2.02 M2
ECHO BSA: 2.02 M2
ECHO EST RA PRESSURE: 3 MMHG
ECHO LA DIAMETER INDEX: 1.57 CM/M2
ECHO LA DIAMETER: 3.1 CM
ECHO LA MAJOR AXIS: 4.8 CM
ECHO LA TO AORTIC ROOT RATIO: 1.03
ECHO LA VOL BP: 39 ML (ref 22–52)
ECHO LA VOL MOD A2C: 48 ML (ref 22–52)
ECHO LA VOL MOD A4C: 28 ML (ref 22–52)
ECHO LA VOL/BSA BIPLANE: 20 ML/M2 (ref 16–34)
ECHO LA VOLUME AREA LENGTH: 41 ML
ECHO LA VOLUME INDEX AREA LENGTH: 21 ML/M2 (ref 16–34)
ECHO LA VOLUME INDEX MOD A2C: 24 ML/M2 (ref 16–34)
ECHO LA VOLUME INDEX MOD A4C: 14 ML/M2 (ref 16–34)
ECHO LV E' LATERAL VELOCITY: 11 CM/S
ECHO LV EDV A2C: 103 ML
ECHO LV EDV A4C: 62 ML
ECHO LV EDV BP: 85 ML (ref 56–104)
ECHO LV EDV INDEX A4C: 31 ML/M2
ECHO LV EDV INDEX BP: 43 ML/M2
ECHO LV EDV NDEX A2C: 52 ML/M2
ECHO LV EJECTION FRACTION BIPLANE: 73 % (ref 55–100)
ECHO LV ESV A2C: 24 ML
ECHO LV ESV A4C: 22 ML
ECHO LV ESV BP: 23 ML (ref 19–49)
ECHO LV ESV INDEX A2C: 12 ML/M2
ECHO LV ESV INDEX A4C: 11 ML/M2
ECHO LV ESV INDEX BP: 12 ML/M2
ECHO LV FRACTIONAL SHORTENING: 33 % (ref 28–44)
ECHO LV INTERNAL DIMENSION DIASTOLE INDEX: 2.12 CM/M2
ECHO LV INTERNAL DIMENSION DIASTOLIC: 4.2 CM (ref 3.9–5.3)
ECHO LV INTERNAL DIMENSION SYSTOLIC INDEX: 1.41 CM/M2
ECHO LV INTERNAL DIMENSION SYSTOLIC: 2.8 CM
ECHO LV IVSD: 1.2 CM (ref 0.6–0.9)
ECHO LV IVSS: 1.5 CM
ECHO LV MASS 2D: 178.2 G (ref 67–162)
ECHO LV MASS INDEX 2D: 90 G/M2 (ref 43–95)
ECHO LV POSTERIOR WALL DIASTOLIC: 1.2 CM (ref 0.6–0.9)
ECHO LV POSTERIOR WALL SYSTOLIC: 1.4 CM
ECHO LV RELATIVE WALL THICKNESS RATIO: 0.57
ECHO LVOT AV VTI INDEX: 0.87
ECHO LVOT MEAN GRADIENT: 4 MMHG
ECHO LVOT VTI: 23.9 CM
ECHO MV A VELOCITY: 1.17 M/S
ECHO MV E DECELERATION TIME (DT): 122.3 MS
ECHO MV E VELOCITY: 0.72 M/S
ECHO MV E/A RATIO: 0.62
ECHO MV E/E' LATERAL: 6.55
ECHO PV MAX VELOCITY: 1.3 M/S
ECHO RIGHT VENTRICULAR SYSTOLIC PRESSURE (RVSP): 9 MMHG
ECHO RV INTERNAL DIMENSION: 2.3 CM
ECHO TV REGURGITANT MAX VELOCITY: 1.25 M/S

## 2024-01-17 PROCEDURE — 6360000004 HC RX CONTRAST MEDICATION: Performed by: FAMILY MEDICINE

## 2024-01-17 PROCEDURE — C8929 TTE W OR WO FOL WCON,DOPPLER: HCPCS

## 2024-01-17 PROCEDURE — 93225 XTRNL ECG REC<48 HRS REC: CPT

## 2024-01-17 RX ADMIN — PERFLUTREN 1.5 ML: 6.52 INJECTION, SUSPENSION INTRAVENOUS at 15:00

## 2024-01-22 DIAGNOSIS — R00.0 TACHYCARDIA: Primary | ICD-10-CM

## 2024-01-22 RX ORDER — METOPROLOL SUCCINATE 25 MG/1
25 TABLET, EXTENDED RELEASE ORAL DAILY
Qty: 30 TABLET | Refills: 5 | Status: SHIPPED | OUTPATIENT
Start: 2024-01-22 | End: 2024-02-01 | Stop reason: DRUGHIGH

## 2024-01-23 LAB
ACQUISITION DURATION: NORMAL S
AVERAGE HEART RATE: 106 BPM
EKG DIAGNOSIS: NORMAL
HOLTER MAX HEART RATE: 147 BPM
HOOKUP DATE: NORMAL
HOOKUP TIME: NORMAL
MAX HEART RATE TIME/DATE: NORMAL
MIN HEART RATE TIME/DATE: NORMAL
MIN HEART RATE: 88 BPM
NUMBER OF QRS COMPLEXES: NORMAL
NUMBER OF SUPRAVENTRICULAR COUPLETS: 0
NUMBER OF SUPRAVENTRICULAR ECTOPICS: 10
NUMBER OF SUPRAVENTRICULAR ISOLATED BEATS: 7
NUMBER OF VENTRICULAR BIGEMINAL CYCLES: 0
NUMBER OF VENTRICULAR COUPLETS: 0
NUMBER OF VENTRICULAR ECTOPICS: 30

## 2024-01-26 ENCOUNTER — HOSPITAL ENCOUNTER (OUTPATIENT)
Age: 54
Discharge: HOME | End: 2024-01-26
Payer: COMMERCIAL

## 2024-01-26 ENCOUNTER — OFFICE VISIT (OUTPATIENT)
Dept: CARDIOLOGY | Age: 54
End: 2024-01-26
Payer: COMMERCIAL

## 2024-01-26 VITALS
OXYGEN SATURATION: 98 % | BODY MASS INDEX: 30.86 KG/M2 | WEIGHT: 196.6 LBS | HEIGHT: 67 IN | HEART RATE: 98 BPM | SYSTOLIC BLOOD PRESSURE: 114 MMHG | RESPIRATION RATE: 18 BRPM | DIASTOLIC BLOOD PRESSURE: 79 MMHG

## 2024-01-26 DIAGNOSIS — R07.89 CHEST TIGHTNESS: ICD-10-CM

## 2024-01-26 DIAGNOSIS — E78.2 MIXED HYPERLIPIDEMIA: ICD-10-CM

## 2024-01-26 DIAGNOSIS — R00.2 PALPITATIONS: ICD-10-CM

## 2024-01-26 DIAGNOSIS — R00.0 TACHYCARDIA: ICD-10-CM

## 2024-01-26 DIAGNOSIS — I10 ESSENTIAL HYPERTENSION: ICD-10-CM

## 2024-01-26 DIAGNOSIS — R93.1 ABNORMAL ECHOCARDIOGRAM: ICD-10-CM

## 2024-01-26 LAB
ERYTHROCYTE [DISTWIDTH] IN BLOOD BY AUTOMATED COUNT: 12.1 % (ref 11.8–14.4)
HCT VFR BLD AUTO: 41.9 % (ref 36.3–47.1)
HGB BLD-MCNC: 14.4 G/DL (ref 11.9–15.1)
MCH RBC QN AUTO: 31.8 PG (ref 25.2–33.5)
MCHC RBC AUTO-ENTMCNC: 34.4 G/DL (ref 28.4–34.8)
MCV RBC AUTO: 92.5 FL (ref 82.6–102.9)
NRBC BLD-RTO: 0 PER 100 WBC
PLATELET # BLD AUTO: 379 K/UL (ref 138–453)
PMV BLD AUTO: 10.1 FL (ref 8.1–13.5)
RBC # BLD AUTO: 4.53 M/UL (ref 3.95–5.11)
TSH SERPL DL<=0.05 MIU/L-ACNC: 2.86 UIU/ML (ref 0.3–5)
WBC OTHER # BLD: 8.5 K/UL (ref 3.5–11.3)

## 2024-01-26 PROCEDURE — 3074F SYST BP LT 130 MM HG: CPT | Performed by: PHYSICIAN ASSISTANT

## 2024-01-26 PROCEDURE — 83540 ASSAY OF IRON: CPT

## 2024-01-26 PROCEDURE — 82607 VITAMIN B-12: CPT

## 2024-01-26 PROCEDURE — 83550 IRON BINDING TEST: CPT

## 2024-01-26 PROCEDURE — 82728 ASSAY OF FERRITIN: CPT

## 2024-01-26 PROCEDURE — 84443 ASSAY THYROID STIM HORMONE: CPT

## 2024-01-26 PROCEDURE — 85027 COMPLETE CBC AUTOMATED: CPT

## 2024-01-26 PROCEDURE — 93000 ELECTROCARDIOGRAM COMPLETE: CPT | Performed by: PHYSICIAN ASSISTANT

## 2024-01-26 PROCEDURE — 99204 OFFICE O/P NEW MOD 45 MIN: CPT | Performed by: PHYSICIAN ASSISTANT

## 2024-01-26 PROCEDURE — 3078F DIAST BP <80 MM HG: CPT | Performed by: PHYSICIAN ASSISTANT

## 2024-01-26 PROCEDURE — 82746 ASSAY OF FOLIC ACID SERUM: CPT

## 2024-01-26 PROCEDURE — 36415 COLL VENOUS BLD VENIPUNCTURE: CPT

## 2024-01-26 NOTE — PROGRESS NOTES
Control Symptomatic  Beta Blocker: Continue Metoprolol succinate (Toprol XL) 25 mg daily.   I may increase her Toprol XL to 50 mg daily pending her CAM results.  Calcium Channel Blocker: Not indicated at this time.   Anticoagulation: Not indicated at this time.  Holter monitor results are pending.      Chest Tightness:   Medical Management for Suspected coronary artery disease:  Antiplatelet Agent: Not indicated at this time.  Beta Blocker: Continue Metoprolol succinate (Toprol XL) 25 mg daily.   Anti-anginal medications: Not indicated at this time.  Cholesterol Reduction Therapy: Continue Atorvastatin (Lipitor) 40 mg daily.    Additional Testing List: I ordered a treadmill stress test WITHOUT imaging to try and rule out this possibility.  Additional counseling: I advised them to call our office or go to the emergency room if they developed worsening or persistent chest pain or increased shortness of breath as this could be life threatening.     Abnormal Echo: Hyperdynamic left ventricular systolic function with a visually estimated EF of greater than 80%.   Possibly compensatory tachycardia.  I ordered an anemic work up; CBC, ferritin, iron & TIBC, vitamin B12 & folate.  Ordered a TSH with reflux to T4.  Additional Testing List: Additional Testing List: I ordered a treadmill stress test WITHOUT imaging to try and rule out this possibility.     Essential Hypertension: Controlled  Beta Blocker: Continue Metoprolol succinate (Toprol XL) 25 mg daily.   ACE Inibitor/ARB: Continue losartan (Cozaar) 100 mg daily.   Calcium Channel Blocker: Not indicated at this time.   Diuretics: Continue Hydrochlorothiazide (HCTZ) 25 mg every morning.     Hyperlipidemia: Mixed - Last LDL on 8/4/2023 was 92 mg/dL   Cholesterol Reduction Therapy: Continue Atorvastatin (Lipitor) 40 mg daily.      In the meantime, I encouraged Ms. Pearson to continue to take her other medications. I discussed patient's symptoms and treatment plan with

## 2024-01-27 LAB
FERRITIN SERPL-MCNC: 135 NG/ML (ref 13–150)
FOLATE SERPL-MCNC: >20 NG/ML
IRON SATN MFR SERPL: 15 % (ref 20–55)
IRON SERPL-MCNC: 84 UG/DL (ref 37–145)
TIBC SERPL-MCNC: 579 UG/DL (ref 250–450)
UNSATURATED IRON BINDING CAPACITY: 495 UG/DL (ref 112–347)
VIT B12 SERPL-MCNC: 637 PG/ML (ref 232–1245)

## 2024-01-29 ENCOUNTER — TELEPHONE (OUTPATIENT)
Dept: CARDIOLOGY | Age: 54
End: 2024-01-29

## 2024-01-29 NOTE — TELEPHONE ENCOUNTER
----- Message from Fide Nance PA-C sent at 1/29/2024  8:12 AM EST -----  Please notify patient that their lab results are fairly normal. I have forwarded lab results to Sapphire Romero APRN - CNP as well.  Please continue current treatment and follow up.    Sapphire- She has been having a lot of fatigue so I did an anemia work up that is attached. I just wanted to keep you in the loop. Thank you!

## 2024-01-31 RX ORDER — HYDROCHLOROTHIAZIDE 25 MG/1
TABLET ORAL
Qty: 90 TABLET | Refills: 3 | Status: SHIPPED | OUTPATIENT
Start: 2024-01-31

## 2024-01-31 RX ORDER — ATORVASTATIN CALCIUM 40 MG/1
TABLET, FILM COATED ORAL
Qty: 90 TABLET | Refills: 3 | Status: SHIPPED | OUTPATIENT
Start: 2024-01-31

## 2024-02-01 RX ORDER — METOPROLOL SUCCINATE 50 MG/1
50 TABLET, EXTENDED RELEASE ORAL DAILY
Qty: 30 TABLET | Refills: 5 | Status: SHIPPED | OUTPATIENT
Start: 2024-02-01

## 2024-02-06 ENCOUNTER — TELEPHONE (OUTPATIENT)
Dept: CARDIOLOGY | Age: 54
End: 2024-02-06

## 2024-02-06 NOTE — TELEPHONE ENCOUNTER
----- Message from Fide Nance PA-C sent at 2/5/2024  8:47 PM EST -----  Can we please call and let her know her holter monitor showed tachycardia 74% of the study. I think I saw that Sapphire increased her Toprol XL. Can we please double check this change was made and ask how symptoms are. Thank you!

## 2024-02-16 RX ORDER — LOSARTAN POTASSIUM 100 MG/1
TABLET ORAL
Qty: 90 TABLET | Refills: 1 | Status: SHIPPED | OUTPATIENT
Start: 2024-02-16

## 2024-02-21 ENCOUNTER — HOSPITAL ENCOUNTER (OUTPATIENT)
Age: 54
Discharge: HOME | End: 2024-02-23
Payer: COMMERCIAL

## 2024-02-21 DIAGNOSIS — R07.89 CHEST TIGHTNESS: ICD-10-CM

## 2024-02-21 DIAGNOSIS — R00.0 TACHYCARDIA: ICD-10-CM

## 2024-02-21 DIAGNOSIS — R00.2 PALPITATIONS: ICD-10-CM

## 2024-02-21 DIAGNOSIS — E78.2 MIXED HYPERLIPIDEMIA: ICD-10-CM

## 2024-02-21 DIAGNOSIS — I10 ESSENTIAL HYPERTENSION: ICD-10-CM

## 2024-02-21 LAB
STRESS BASELINE DIAS BP: 72 MMHG
STRESS BASELINE HR: 96 BPM
STRESS BASELINE SYS BP: 122 MMHG
STRESS PEAK DIAS BP: 84 MMHG
STRESS PEAK SYS BP: 162 MMHG
STRESS PERCENT HR ACHIEVED: 99 %
STRESS POST PEAK HR: 166 BPM
STRESS RATE PRESSURE PRODUCT: NORMAL BPM*MMHG
STRESS STAGE 1 BP: NORMAL MMHG
STRESS STAGE 1 DURATION: 3 MIN:SEC
STRESS STAGE 1 HR: 141 BPM
STRESS STAGE 2 BP: NORMAL MMHG
STRESS STAGE 2 DURATION: 6 MIN:SEC
STRESS STAGE 2 HR: 164 BPM
STRESS STAGE RECOVERY 1 BP: NORMAL MMHG
STRESS STAGE RECOVERY 1 DURATION: 0 MIN:SEC
STRESS STAGE RECOVERY 1 HR: 166 BPM
STRESS STAGE RECOVERY 2 DURATION: 1 MIN:SEC
STRESS STAGE RECOVERY 2 HR: 150 BPM
STRESS STAGE RECOVERY 3 BP: NORMAL MMHG
STRESS STAGE RECOVERY 3 DURATION: 3 MIN:SEC
STRESS STAGE RECOVERY 3 HR: 114 BPM
STRESS STAGE RECOVERY 4 BP: NORMAL MMHG
STRESS STAGE RECOVERY 4 DURATION: 5 MIN:SEC
STRESS STAGE RECOVERY 4 HR: 108 BPM
STRESS TARGET HR: 167 BPM

## 2024-02-21 PROCEDURE — 93016 CV STRESS TEST SUPVJ ONLY: CPT | Performed by: INTERNAL MEDICINE

## 2024-02-21 PROCEDURE — 93017 CV STRESS TEST TRACING ONLY: CPT

## 2024-02-21 NOTE — PROGRESS NOTES
Stress results reviewed with Dr Larson. No new orders at this time, okay to d/c with scheduled fl/u.

## 2024-02-22 ENCOUNTER — TELEPHONE (OUTPATIENT)
Dept: CARDIOLOGY | Age: 54
End: 2024-02-22

## 2024-02-22 DIAGNOSIS — R00.0 TACHYCARDIA: Primary | ICD-10-CM

## 2024-02-22 DIAGNOSIS — R07.89 CHEST TIGHTNESS: ICD-10-CM

## 2024-02-22 NOTE — TELEPHONE ENCOUNTER
----- Message from Maxi Williamson MD sent at 2/22/2024 12:14 AM EST -----  Please let Ms. Pearson know that her treadmill stress test was inconclusive so we need to order a stress test with cardiac imaging. Please order Liana Stress test if patient agreeable. Thanks.

## 2024-03-07 ENCOUNTER — HOSPITAL ENCOUNTER (OUTPATIENT)
Dept: NUCLEAR MEDICINE | Age: 54
Discharge: HOME | End: 2024-03-09
Attending: FAMILY MEDICINE
Payer: COMMERCIAL

## 2024-03-07 ENCOUNTER — HOSPITAL ENCOUNTER (OUTPATIENT)
Age: 54
Discharge: HOME | End: 2024-03-09
Attending: FAMILY MEDICINE
Payer: COMMERCIAL

## 2024-03-07 DIAGNOSIS — R07.89 CHEST TIGHTNESS: ICD-10-CM

## 2024-03-07 DIAGNOSIS — R00.0 TACHYCARDIA: ICD-10-CM

## 2024-03-07 PROCEDURE — 6360000002 HC RX W HCPCS

## 2024-03-07 PROCEDURE — 93017 CV STRESS TEST TRACING ONLY: CPT

## 2024-03-07 PROCEDURE — A9500 TC99M SESTAMIBI: HCPCS | Performed by: FAMILY MEDICINE

## 2024-03-07 PROCEDURE — 3430000000 HC RX DIAGNOSTIC RADIOPHARMACEUTICAL: Performed by: FAMILY MEDICINE

## 2024-03-07 RX ORDER — TETRAKIS(2-METHOXYISOBUTYLISOCYANIDE)COPPER(I) TETRAFLUOROBORATE 1 MG/ML
30 INJECTION, POWDER, LYOPHILIZED, FOR SOLUTION INTRAVENOUS
Status: COMPLETED | OUTPATIENT
Start: 2024-03-07 | End: 2024-03-07

## 2024-03-07 RX ORDER — REGADENOSON 0.08 MG/ML
0.4 INJECTION, SOLUTION INTRAVENOUS
Status: COMPLETED | OUTPATIENT
Start: 2024-03-07 | End: 2024-03-07

## 2024-03-07 RX ORDER — REGADENOSON 0.08 MG/ML
INJECTION, SOLUTION INTRAVENOUS
Status: COMPLETED
Start: 2024-03-07 | End: 2024-03-07

## 2024-03-07 RX ADMIN — REGADENOSON 0.4 MG: 0.08 INJECTION, SOLUTION INTRAVENOUS at 11:21

## 2024-03-07 RX ADMIN — Medication 30 MILLICURIE: at 11:05

## 2024-03-08 ENCOUNTER — HOSPITAL ENCOUNTER (OUTPATIENT)
Dept: NUCLEAR MEDICINE | Age: 54
Discharge: HOME | End: 2024-03-10
Attending: FAMILY MEDICINE
Payer: COMMERCIAL

## 2024-03-08 LAB
NUC STRESS EJECTION FRACTION: 84 %
STRESS BASELINE DIAS BP: 82 MMHG
STRESS BASELINE HR: 111 BPM
STRESS BASELINE SYS BP: 118 MMHG
STRESS PEAK DIAS BP: 80 MMHG
STRESS PEAK SYS BP: 130 MMHG
STRESS PERCENT HR ACHIEVED: 87 %
STRESS POST PEAK HR: 146 BPM
STRESS RATE PRESSURE PRODUCT: NORMAL BPM*MMHG
STRESS ST DEPRESSION: 1.3 MM
STRESS STAGE RECOVERY 1 BP: NORMAL MMHG
STRESS STAGE RECOVERY 1 DURATION: 0 MIN:SEC
STRESS STAGE RECOVERY 1 HR: 146 BPM
STRESS STAGE RECOVERY 2 DURATION: 1 MIN:SEC
STRESS STAGE RECOVERY 2 HR: 136 BPM
STRESS STAGE RECOVERY 3 BP: NORMAL MMHG
STRESS STAGE RECOVERY 3 DURATION: 3 MIN:SEC
STRESS STAGE RECOVERY 3 HR: 125 BPM
STRESS STAGE RECOVERY 4 BP: NORMAL MMHG
STRESS STAGE RECOVERY 4 DURATION: 5 MIN:SEC
STRESS STAGE RECOVERY 4 HR: 116 BPM
STRESS TARGET HR: 167 BPM
TID: 1.09

## 2024-03-08 PROCEDURE — 3430000000 HC RX DIAGNOSTIC RADIOPHARMACEUTICAL: Performed by: FAMILY MEDICINE

## 2024-03-08 PROCEDURE — A9500 TC99M SESTAMIBI: HCPCS | Performed by: FAMILY MEDICINE

## 2024-03-08 RX ORDER — TETRAKIS(2-METHOXYISOBUTYLISOCYANIDE)COPPER(I) TETRAFLUOROBORATE 1 MG/ML
30 INJECTION, POWDER, LYOPHILIZED, FOR SOLUTION INTRAVENOUS
Status: COMPLETED | OUTPATIENT
Start: 2024-03-08 | End: 2024-03-08

## 2024-03-08 RX ADMIN — Medication 30 MILLICURIE: at 14:19

## 2024-03-11 ENCOUNTER — TELEPHONE (OUTPATIENT)
Dept: CARDIOLOGY | Age: 54
End: 2024-03-11

## 2024-03-11 NOTE — TELEPHONE ENCOUNTER
----- Message from Maxi Williamson MD sent at 3/9/2024 12:10 AM EST -----  Let Ms. Pearson know their test result was ok. Will discuss at next visit. Thanks.

## 2024-03-20 ENCOUNTER — OFFICE VISIT (OUTPATIENT)
Dept: PRIMARY CARE CLINIC | Age: 54
End: 2024-03-20
Payer: COMMERCIAL

## 2024-03-20 VITALS
DIASTOLIC BLOOD PRESSURE: 70 MMHG | HEART RATE: 100 BPM | OXYGEN SATURATION: 97 % | BODY MASS INDEX: 30.92 KG/M2 | HEIGHT: 67 IN | TEMPERATURE: 96.3 F | WEIGHT: 197 LBS | SYSTOLIC BLOOD PRESSURE: 110 MMHG

## 2024-03-20 DIAGNOSIS — R05.3 CHRONIC COUGH: ICD-10-CM

## 2024-03-20 DIAGNOSIS — R00.0 TACHYCARDIA: Primary | ICD-10-CM

## 2024-03-20 DIAGNOSIS — F32.4 MAJOR DEPRESSIVE DISORDER IN PARTIAL REMISSION, UNSPECIFIED WHETHER RECURRENT (HCC): ICD-10-CM

## 2024-03-20 DIAGNOSIS — Z00.00 WELLNESS EXAMINATION: ICD-10-CM

## 2024-03-20 PROCEDURE — 3078F DIAST BP <80 MM HG: CPT | Performed by: NURSE PRACTITIONER

## 2024-03-20 PROCEDURE — 3074F SYST BP LT 130 MM HG: CPT | Performed by: NURSE PRACTITIONER

## 2024-03-20 PROCEDURE — 99214 OFFICE O/P EST MOD 30 MIN: CPT | Performed by: NURSE PRACTITIONER

## 2024-03-20 ASSESSMENT — PATIENT HEALTH QUESTIONNAIRE - PHQ9
4. FEELING TIRED OR HAVING LITTLE ENERGY: NOT AT ALL
10. IF YOU CHECKED OFF ANY PROBLEMS, HOW DIFFICULT HAVE THESE PROBLEMS MADE IT FOR YOU TO DO YOUR WORK, TAKE CARE OF THINGS AT HOME, OR GET ALONG WITH OTHER PEOPLE: NOT DIFFICULT AT ALL
SUM OF ALL RESPONSES TO PHQ9 QUESTIONS 1 & 2: 0
7. TROUBLE CONCENTRATING ON THINGS, SUCH AS READING THE NEWSPAPER OR WATCHING TELEVISION: NOT AT ALL
3. TROUBLE FALLING OR STAYING ASLEEP: NOT AT ALL
1. LITTLE INTEREST OR PLEASURE IN DOING THINGS: NOT AT ALL
6. FEELING BAD ABOUT YOURSELF - OR THAT YOU ARE A FAILURE OR HAVE LET YOURSELF OR YOUR FAMILY DOWN: NOT AT ALL
SUM OF ALL RESPONSES TO PHQ QUESTIONS 1-9: 0
SUM OF ALL RESPONSES TO PHQ QUESTIONS 1-9: 0
2. FEELING DOWN, DEPRESSED OR HOPELESS: NOT AT ALL
SUM OF ALL RESPONSES TO PHQ QUESTIONS 1-9: 0
SUM OF ALL RESPONSES TO PHQ QUESTIONS 1-9: 0
9. THOUGHTS THAT YOU WOULD BE BETTER OFF DEAD, OR OF HURTING YOURSELF: NOT AT ALL
8. MOVING OR SPEAKING SO SLOWLY THAT OTHER PEOPLE COULD HAVE NOTICED. OR THE OPPOSITE, BEING SO FIGETY OR RESTLESS THAT YOU HAVE BEEN MOVING AROUND A LOT MORE THAN USUAL: NOT AT ALL
5. POOR APPETITE OR OVEREATING: NOT AT ALL

## 2024-03-20 ASSESSMENT — ENCOUNTER SYMPTOMS: COUGH: 1

## 2024-03-20 NOTE — PROGRESS NOTES
Sapphire Romero APRN - CNP   DULOXETINE HCL PO Take 60 mg by mouth daily 11/22/23  Yes Provider, MD Julien   ALPRAZolam (XANAX) 0.25 MG tablet Take 1 tablet by mouth as needed. 9/15/23  Yes Provider, MD Julien   levothyroxine (SYNTHROID) 137 MCG tablet TAKE 1 TABLET BY MOUTH EVERY DAY 9/11/23  Yes Sapphire Romero APRN - CNP   butalbital-acetaminophen-caffeine (FIORICET, ESGIC) -40 MG per tablet TAKE 1 TABLET BY MOUTH EVERY 4 HOURS AS NEEDED FOR HEADACHE. DO NOT EXCEED 6 TABLETS DAILY. 1/9/23  Yes Sapphire Romero APRN - CNP        Allergies:       Patient has no known allergies.    Social History:     Tobacco:    reports that she has never smoked. She has never used smokeless tobacco.  Alcohol:      reports current alcohol use.  Drug Use:  reports no history of drug use.    Family History:     Family History   Problem Relation Age of Onset    Cancer Father         Kidney Cancer    High Blood Pressure Father     High Cholesterol Father     Valvular Heart Disease Father     Cancer Maternal Aunt         breast    Breast Cancer Maternal Aunt     Breast Cancer Maternal Aunt        Review of Systems:     Positive and Negative as described in HPI    Review of Systems   Respiratory:  Positive for cough.    Cardiovascular:  Negative for chest pain and palpitations.       Physical Exam:   Vitals:  /70   Pulse 100   Temp (!) 96.3 °F (35.7 °C)   Ht 1.702 m (5' 7.01\")   Wt 89.4 kg (197 lb)   SpO2 97%   BMI 30.85 kg/m²     Physical Exam  Constitutional:       General: She is not in acute distress.     Appearance: Normal appearance. She is obese. She is not ill-appearing or toxic-appearing.   Cardiovascular:      Rate and Rhythm: Normal rate and regular rhythm.      Heart sounds: Normal heart sounds. No murmur heard.  Pulmonary:      Effort: Pulmonary effort is normal. No respiratory distress.      Breath sounds: Normal breath sounds. No stridor. No wheezing, rhonchi or rales.   Neurological:

## 2024-03-25 ENCOUNTER — OFFICE VISIT (OUTPATIENT)
Dept: CARDIOLOGY | Age: 54
End: 2024-03-25
Payer: COMMERCIAL

## 2024-03-25 ENCOUNTER — HOSPITAL ENCOUNTER (OUTPATIENT)
Age: 54
Discharge: HOME | End: 2024-03-27
Payer: COMMERCIAL

## 2024-03-25 VITALS
HEART RATE: 98 BPM | WEIGHT: 194 LBS | DIASTOLIC BLOOD PRESSURE: 79 MMHG | BODY MASS INDEX: 30.45 KG/M2 | HEIGHT: 67 IN | SYSTOLIC BLOOD PRESSURE: 121 MMHG | OXYGEN SATURATION: 97 % | RESPIRATION RATE: 18 BRPM

## 2024-03-25 DIAGNOSIS — R00.2 PALPITATIONS: ICD-10-CM

## 2024-03-25 DIAGNOSIS — R93.1 ABNORMAL ECHOCARDIOGRAM: ICD-10-CM

## 2024-03-25 DIAGNOSIS — I10 ESSENTIAL HYPERTENSION: ICD-10-CM

## 2024-03-25 DIAGNOSIS — G47.33 OSA (OBSTRUCTIVE SLEEP APNEA): ICD-10-CM

## 2024-03-25 DIAGNOSIS — R07.89 CHEST TIGHTNESS: ICD-10-CM

## 2024-03-25 DIAGNOSIS — R00.0 TACHYCARDIA: Primary | ICD-10-CM

## 2024-03-25 DIAGNOSIS — E78.2 MIXED HYPERLIPIDEMIA: ICD-10-CM

## 2024-03-25 DIAGNOSIS — R00.0 TACHYCARDIA: ICD-10-CM

## 2024-03-25 LAB — ECHO BSA: 2.04 M2

## 2024-03-25 PROCEDURE — 99214 OFFICE O/P EST MOD 30 MIN: CPT | Performed by: PHYSICIAN ASSISTANT

## 2024-03-25 PROCEDURE — 3074F SYST BP LT 130 MM HG: CPT | Performed by: PHYSICIAN ASSISTANT

## 2024-03-25 PROCEDURE — 93243 EXT ECG>48HR<7D SCAN A/R: CPT

## 2024-03-25 PROCEDURE — 3078F DIAST BP <80 MM HG: CPT | Performed by: PHYSICIAN ASSISTANT

## 2024-03-25 RX ORDER — METOPROLOL SUCCINATE 100 MG/1
100 TABLET, EXTENDED RELEASE ORAL DAILY
Qty: 90 TABLET | Refills: 3 | Status: SHIPPED | OUTPATIENT
Start: 2024-03-25

## 2024-03-25 NOTE — PROGRESS NOTES
father.     Physical Examination:     /79 (Site: Right Upper Arm, Position: Sitting, Cuff Size: Medium Adult)   Pulse 98   Resp 18   Ht 1.702 m (5' 7\")   Wt 88 kg (194 lb)   SpO2 97%   BMI 30.38 kg/m²  Body mass index is 30.38 kg/m².    Constitutional: She is oriented to person, place, and time. She appears well-developed and well-nourished. In no acute distress.   HEENT: Normocephalic and atraumatic.No JVD present. Carotid bruit is not present. No mass and no thyromegaly present. No lymphadenopathy present.  Cardiovascular: Tachycardic rate, regular rhythm, normal heart sounds. Exam reveals no gallop and no friction rubs. No murmur was heard.  Pulmonary/Chest: Effort normal and breath sounds normal. No respiratory distress. She has no wheezes, rhonchi or rales. Coarseness in the base of the right lung.  Abdominal: Soft, non-tender. Bowel sounds and aorta are normal. She exhibits no organomegaly, mass or bruit.   Extremities: None. No cyanosis or clubbing. 2+ radial and carotid pulses. Distal extremity pulses: 2+ bilaterally.  Neurological: She is alert and oriented to person, place, and time. No evidence of gross cranial nerve deficit. Coordination appeared normal.   Skin: Skin is warm and dry. There is no rash or diaphoresis.   Psychiatric: She has a normal mood and affect. Her speech is normal and behavior is normal.        MOST RECENT LABS ON RECORD:   Lab Results   Component Value Date    WBC 8.5 01/26/2024    HGB 14.4 01/26/2024    HCT 41.9 01/26/2024     01/26/2024    CHOL 182 08/04/2023    TRIG 185 (H) 08/04/2023    HDL 53 08/04/2023    LDLDIRECT 100 04/13/2021    ALT 28 08/04/2023    AST 25 08/04/2023     08/04/2023    K 3.8 08/04/2023     08/04/2023    CREATININE 0.8 08/04/2023    BUN 19 08/04/2023    CO2 26 08/04/2023    TSH 2.86 01/26/2024    LABA1C 5.4 08/04/2023       ASSESSMENT:     1. Tachycardia    2. Palpitations    3. Chest tightness    4. Abnormal echocardiogram

## 2024-04-04 ENCOUNTER — TELEPHONE (OUTPATIENT)
Dept: CARDIOLOGY | Age: 54
End: 2024-04-04

## 2024-04-04 LAB — ECHO BSA: 2.04 M2

## 2024-04-04 NOTE — TELEPHONE ENCOUNTER
----- Message from Fide Nance PA-C sent at 4/4/2024  4:14 PM EDT -----  Please let them know that their CAM monitor was overall unremarkable. It did show average heart rate of 94 bpm and sinus tachycardia 17% of the study. If she is feeling better we can keep her Toprol XL the same, if having symptoms we can adjust her medications. Please let me know how she is doing.     We will discuss at their follow up appointment.

## 2024-04-15 ENCOUNTER — HOSPITAL ENCOUNTER (OUTPATIENT)
Age: 54
Discharge: HOME | End: 2024-04-15
Payer: COMMERCIAL

## 2024-04-15 ENCOUNTER — OFFICE VISIT (OUTPATIENT)
Dept: PRIMARY CARE CLINIC | Age: 54
End: 2024-04-15
Payer: COMMERCIAL

## 2024-04-15 VITALS
TEMPERATURE: 97.7 F | HEIGHT: 67 IN | HEART RATE: 90 BPM | SYSTOLIC BLOOD PRESSURE: 100 MMHG | WEIGHT: 200 LBS | OXYGEN SATURATION: 96 % | DIASTOLIC BLOOD PRESSURE: 72 MMHG | BODY MASS INDEX: 31.39 KG/M2

## 2024-04-15 DIAGNOSIS — K59.09 CHRONIC CONSTIPATION: ICD-10-CM

## 2024-04-15 DIAGNOSIS — E03.8 OTHER SPECIFIED HYPOTHYROIDISM: ICD-10-CM

## 2024-04-15 DIAGNOSIS — G47.33 OSA (OBSTRUCTIVE SLEEP APNEA): ICD-10-CM

## 2024-04-15 DIAGNOSIS — Z00.00 ANNUAL PHYSICAL EXAM: ICD-10-CM

## 2024-04-15 DIAGNOSIS — Z00.00 WELLNESS EXAMINATION: Primary | ICD-10-CM

## 2024-04-15 DIAGNOSIS — Z00.00 WELLNESS EXAMINATION: ICD-10-CM

## 2024-04-15 LAB
25(OH)D3 SERPL-MCNC: 43 NG/ML (ref 30–100)
ALBUMIN SERPL-MCNC: 4.7 G/DL (ref 3.5–5.2)
ALBUMIN/GLOB SERPL: 1.5 {RATIO} (ref 1–2.5)
ALP SERPL-CCNC: 62 U/L (ref 35–104)
ALT SERPL-CCNC: 30 U/L (ref 5–33)
ANION GAP SERPL CALCULATED.3IONS-SCNC: 12 MMOL/L (ref 9–17)
AST SERPL-CCNC: 29 U/L
BILIRUB SERPL-MCNC: 0.4 MG/DL (ref 0.3–1.2)
BUN SERPL-MCNC: 18 MG/DL (ref 6–20)
BUN/CREAT SERPL: 20 (ref 9–20)
CALCIUM SERPL-MCNC: 10.3 MG/DL (ref 8.6–10.4)
CHLORIDE SERPL-SCNC: 104 MMOL/L (ref 98–107)
CHOLEST SERPL-MCNC: 164 MG/DL (ref 0–199)
CHOLESTEROL/HDL RATIO: 3
CO2 SERPL-SCNC: 26 MMOL/L (ref 20–31)
CREAT SERPL-MCNC: 0.9 MG/DL (ref 0.5–0.9)
ERYTHROCYTE [DISTWIDTH] IN BLOOD BY AUTOMATED COUNT: 12.3 % (ref 11.8–14.4)
EST. AVERAGE GLUCOSE BLD GHB EST-MCNC: 108 MG/DL
GFR SERPL CREATININE-BSD FRML MDRD: 76 ML/MIN/1.73M2
GLUCOSE SERPL-MCNC: 75 MG/DL (ref 70–99)
HBA1C MFR BLD: 5.4 % (ref 4–6)
HCT VFR BLD AUTO: 42.6 % (ref 36.3–47.1)
HDLC SERPL-MCNC: 56 MG/DL
HGB BLD-MCNC: 14 G/DL (ref 11.9–15.1)
LDLC SERPL CALC-MCNC: 86 MG/DL (ref 0–100)
MCH RBC QN AUTO: 31 PG (ref 25.2–33.5)
MCHC RBC AUTO-ENTMCNC: 32.9 G/DL (ref 28.4–34.8)
MCV RBC AUTO: 94.5 FL (ref 82.6–102.9)
NRBC BLD-RTO: 0 PER 100 WBC
PLATELET # BLD AUTO: 360 K/UL (ref 138–453)
PMV BLD AUTO: 10.7 FL (ref 8.1–13.5)
POTASSIUM SERPL-SCNC: 4.6 MMOL/L (ref 3.7–5.3)
PROT SERPL-MCNC: 7.9 G/DL (ref 6.4–8.3)
RBC # BLD AUTO: 4.51 M/UL (ref 3.95–5.11)
SODIUM SERPL-SCNC: 142 MMOL/L (ref 135–144)
T4 FREE SERPL-MCNC: 1.3 NG/DL (ref 0.92–1.68)
TRIGL SERPL-MCNC: 114 MG/DL
TSH SERPL DL<=0.05 MIU/L-ACNC: 5.86 UIU/ML (ref 0.3–5)
VLDLC SERPL CALC-MCNC: 23 MG/DL
WBC OTHER # BLD: 9.4 K/UL (ref 3.5–11.3)

## 2024-04-15 PROCEDURE — 3078F DIAST BP <80 MM HG: CPT | Performed by: NURSE PRACTITIONER

## 2024-04-15 PROCEDURE — 3074F SYST BP LT 130 MM HG: CPT | Performed by: NURSE PRACTITIONER

## 2024-04-15 PROCEDURE — 80053 COMPREHEN METABOLIC PANEL: CPT

## 2024-04-15 PROCEDURE — 84439 ASSAY OF FREE THYROXINE: CPT

## 2024-04-15 PROCEDURE — 84443 ASSAY THYROID STIM HORMONE: CPT

## 2024-04-15 PROCEDURE — 99396 PREV VISIT EST AGE 40-64: CPT | Performed by: NURSE PRACTITIONER

## 2024-04-15 PROCEDURE — 80061 LIPID PANEL: CPT

## 2024-04-15 PROCEDURE — 85027 COMPLETE CBC AUTOMATED: CPT

## 2024-04-15 PROCEDURE — 36415 COLL VENOUS BLD VENIPUNCTURE: CPT

## 2024-04-15 PROCEDURE — 99213 OFFICE O/P EST LOW 20 MIN: CPT | Performed by: NURSE PRACTITIONER

## 2024-04-15 PROCEDURE — 83036 HEMOGLOBIN GLYCOSYLATED A1C: CPT

## 2024-04-15 PROCEDURE — 82306 VITAMIN D 25 HYDROXY: CPT

## 2024-04-15 SDOH — ECONOMIC STABILITY: FOOD INSECURITY: WITHIN THE PAST 12 MONTHS, THE FOOD YOU BOUGHT JUST DIDN'T LAST AND YOU DIDN'T HAVE MONEY TO GET MORE.: NEVER TRUE

## 2024-04-15 SDOH — ECONOMIC STABILITY: FOOD INSECURITY: WITHIN THE PAST 12 MONTHS, YOU WORRIED THAT YOUR FOOD WOULD RUN OUT BEFORE YOU GOT MONEY TO BUY MORE.: NEVER TRUE

## 2024-04-15 SDOH — ECONOMIC STABILITY: INCOME INSECURITY: HOW HARD IS IT FOR YOU TO PAY FOR THE VERY BASICS LIKE FOOD, HOUSING, MEDICAL CARE, AND HEATING?: NOT HARD AT ALL

## 2024-04-15 ASSESSMENT — ENCOUNTER SYMPTOMS
CONSTIPATION: 1
SHORTNESS OF BREATH: 1
DIARRHEA: 0
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
## 2024-04-16 RX ORDER — LEVOTHYROXINE SODIUM 0.15 MG/1
150 TABLET ORAL DAILY
Qty: 30 TABLET | Refills: 5 | Status: SHIPPED | OUTPATIENT
Start: 2024-04-16

## 2024-04-30 ENCOUNTER — HOSPITAL ENCOUNTER (OUTPATIENT)
Dept: PULMONOLOGY | Age: 54
Discharge: HOME | End: 2024-04-30
Payer: COMMERCIAL

## 2024-04-30 ENCOUNTER — HOSPITAL ENCOUNTER (OUTPATIENT)
Dept: CT IMAGING | Age: 54
Discharge: HOME | End: 2024-05-02
Payer: COMMERCIAL

## 2024-04-30 DIAGNOSIS — R05.3 CHRONIC COUGH: ICD-10-CM

## 2024-04-30 PROCEDURE — 6370000000 HC RX 637 (ALT 250 FOR IP): Performed by: INTERNAL MEDICINE

## 2024-04-30 PROCEDURE — 71250 CT THORAX DX C-: CPT

## 2024-04-30 PROCEDURE — 94729 DIFFUSING CAPACITY: CPT

## 2024-04-30 PROCEDURE — 94060 EVALUATION OF WHEEZING: CPT

## 2024-04-30 PROCEDURE — 94726 PLETHYSMOGRAPHY LUNG VOLUMES: CPT

## 2024-04-30 PROCEDURE — 94664 DEMO&/EVAL PT USE INHALER: CPT

## 2024-04-30 RX ORDER — ALBUTEROL SULFATE 90 UG/1
4 AEROSOL, METERED RESPIRATORY (INHALATION) ONCE
Status: COMPLETED | OUTPATIENT
Start: 2024-04-30 | End: 2024-04-30

## 2024-04-30 RX ADMIN — ALBUTEROL SULFATE 4 PUFF: 90 AEROSOL, METERED RESPIRATORY (INHALATION) at 16:20

## 2024-05-01 ENCOUNTER — TELEPHONE (OUTPATIENT)
Dept: PRIMARY CARE CLINIC | Age: 54
End: 2024-05-01

## 2024-05-01 ENCOUNTER — HOSPITAL ENCOUNTER (OUTPATIENT)
Dept: WOMENS IMAGING | Age: 54
Discharge: HOME | End: 2024-05-03
Payer: COMMERCIAL

## 2024-05-01 DIAGNOSIS — R93.89 ABNORMAL CT SCAN: ICD-10-CM

## 2024-05-01 DIAGNOSIS — Z12.31 SCREENING MAMMOGRAM FOR BREAST CANCER: ICD-10-CM

## 2024-05-01 DIAGNOSIS — R93.89 ABNORMAL CT OF THE CHEST: Primary | ICD-10-CM

## 2024-05-01 DIAGNOSIS — R05.3 CHRONIC COUGH: Primary | ICD-10-CM

## 2024-05-01 DIAGNOSIS — R05.3 CHRONIC COUGH: ICD-10-CM

## 2024-05-01 PROCEDURE — 77063 BREAST TOMOSYNTHESIS BI: CPT

## 2024-05-01 NOTE — TELEPHONE ENCOUNTER
----- Message from KAREN Chavarria - CNP sent at 5/1/2024 12:43 PM EDT -----  Please notify CT chest does show some non-specific findings concerning for mild lung scarring bilateral lower lobes. With her chronic cough, recommendation is to repeat imaging in 3 months for monitoring. Order placed.     I also recommend referral to pulmonology. Would she like to see them here at [facility], or elsewhere?

## 2024-05-30 RX ORDER — METOPROLOL SUCCINATE 50 MG/1
50 TABLET, EXTENDED RELEASE ORAL DAILY
Qty: 90 TABLET | Refills: 1 | OUTPATIENT
Start: 2024-05-30

## 2024-08-09 ENCOUNTER — OFFICE VISIT (OUTPATIENT)
Dept: PRIMARY CARE CLINIC | Age: 54
End: 2024-08-09

## 2024-08-09 ENCOUNTER — HOSPITAL ENCOUNTER (OUTPATIENT)
Age: 54
Discharge: HOME | End: 2024-08-09
Payer: COMMERCIAL

## 2024-08-09 VITALS
OXYGEN SATURATION: 97 % | SYSTOLIC BLOOD PRESSURE: 100 MMHG | WEIGHT: 201 LBS | HEART RATE: 92 BPM | DIASTOLIC BLOOD PRESSURE: 70 MMHG | BODY MASS INDEX: 31.47 KG/M2
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
## 2024-08-09 PROCEDURE — 36415 COLL VENOUS BLD VENIPUNCTURE: CPT

## 2024-08-09 PROCEDURE — 83516 IMMUNOASSAY NONANTIBODY: CPT

## 2024-08-09 RX ORDER — LACTULOSE 10 G/15ML
SOLUTION ORAL
COMMUNITY
Start: 2024-07-15

## 2024-08-09 ASSESSMENT — PATIENT HEALTH QUESTIONNAIRE - PHQ9
SUM OF ALL RESPONSES TO PHQ QUESTIONS 1-9: 0
SUM OF ALL RESPONSES TO PHQ9 QUESTIONS 1 & 2: 0
2. FEELING DOWN, DEPRESSED OR HOPELESS: NOT AT ALL
1. LITTLE INTEREST OR PLEASURE IN DOING THINGS: NOT AT ALL
SUM OF ALL RESPONSES TO PHQ QUESTIONS 1-9: 0

## 2024-08-09 NOTE — PROGRESS NOTES
[facility]      Patient:  Lazaro Pearson 54 y.o. female     Date of Service: 08/09/24        Chief Complaint:   Chief Complaint   Patient presents with    Insect Bite     Pt. Stated a week or so got a few bug bites. Bites looked they had rings around them. Was told she should get them checked to make sure it wasn't Lyme disease by a friend.         History of Present Illness     Concern of insect bites that occurred approximately 1 week prior.  Currently feels well with no fever, chills, joint pain.  Did not see the insect that bit her, did not see any ticks such as black legged tick/Ixodes tick.  Did feel a red rash arise after the insect bite which was concerning for a white/red ring around it.        Allergies:    Patient has no known allergies.    Medication List:    Current Outpatient Medications   Medication Sig Dispense Refill    lactulose (CHRONULAC) 10 GM/15ML solution TAKE 30 ML BY MOUTH TWICE DAILY AS NEEDED FOR CONSTIPATION      levothyroxine (SYNTHROID) 150 MCG tablet Take 1 tablet by mouth daily 30 tablet 5    metoprolol succinate (TOPROL XL) 100 MG extended release tablet Take 1 tablet by mouth daily 90 tablet 3    losartan (COZAAR) 100 MG tablet TAKE 1 TABLET BY MOUTH EVERY DAY 90 tablet 1    hydroCHLOROthiazide (HYDRODIURIL) 25 MG tablet TAKE 1 TABLET BY MOUTH EVERY DAY IN THE MORNING 90 tablet 3    atorvastatin (LIPITOR) 40 MG tablet TAKE 1 TABLET BY MOUTH EVERY DAY 90 tablet 3    fenofibrate (TRICOR) 145 MG tablet Take 1 tablet by mouth daily 90 tablet 3    albuterol sulfate HFA (VENTOLIN HFA) 108 (90 Base) MCG/ACT inhaler Inhale 2 puffs into the lungs every 4 hours as needed for Wheezing or Shortness of Breath 54 g 0    mirtazapine (REMERON) 45 MG tablet Take 1 tablet by mouth nightly 90 tablet 3    DULOXETINE HCL PO Take 60 mg by mouth daily      ALPRAZolam (XANAX) 0.25 MG tablet Take 1 tablet by mouth as needed.      butalbital-acetaminophen-caffeine (FIORICET, ESGIC)

## 2024-08-12 LAB
TISSUE TRANSGLUTAMINASE ANTIBODY IGG: <0.6 U/ML
TTG IGA SER IA-ACNC: <0.1 U/ML

## 2024-08-19 RX ORDER — LEVOTHYROXINE SODIUM 0.15 MG/1
150 TABLET ORAL DAILY
Qty: 90 TABLET | Refills: 1 | Status: SHIPPED | OUTPATIENT
Start: 2024-08-19

## 2024-08-19 RX ORDER — LOSARTAN POTASSIUM 100 MG/1
TABLET ORAL
Qty: 90 TABLET | Refills: 3 | Status: SHIPPED | OUTPATIENT
Start: 2024-08-19

## 2024-08-21 ENCOUNTER — OFFICE VISIT (OUTPATIENT)
Dept: PULMONOLOGY | Age: 54
End: 2024-08-21
Payer: COMMERCIAL

## 2024-08-21 VITALS
DIASTOLIC BLOOD PRESSURE: 79 MMHG | RESPIRATION RATE: 16 BRPM | BODY MASS INDEX: 31.19 KG/M2 | HEIGHT: 67 IN | OXYGEN SATURATION: 97 % | WEIGHT: 198.7 LBS | HEART RATE: 87 BPM | SYSTOLIC BLOOD PRESSURE: 118 MMHG | TEMPERATURE: 97.6 F

## 2024-08-21 DIAGNOSIS — J98.4 SCARRING OF LUNG: ICD-10-CM

## 2024-08-21 DIAGNOSIS — G47.33 OSA (OBSTRUCTIVE SLEEP APNEA): ICD-10-CM

## 2024-08-21 DIAGNOSIS — J30.2 SEASONAL ALLERGIES: ICD-10-CM

## 2024-08-21 DIAGNOSIS — J45.40 MODERATE PERSISTENT ASTHMA WITHOUT COMPLICATION: Primary | ICD-10-CM

## 2024-08-21 PROCEDURE — 99204 OFFICE O/P NEW MOD 45 MIN: CPT | Performed by: STUDENT IN AN ORGANIZED HEALTH CARE EDUCATION/TRAINING PROGRAM

## 2024-08-21 PROCEDURE — 3074F SYST BP LT 130 MM HG: CPT | Performed by: STUDENT IN AN ORGANIZED HEALTH CARE EDUCATION/TRAINING PROGRAM

## 2024-08-21 PROCEDURE — 3078F DIAST BP <80 MM HG: CPT | Performed by: STUDENT IN AN ORGANIZED HEALTH CARE EDUCATION/TRAINING PROGRAM

## 2024-08-21 RX ORDER — MONTELUKAST SODIUM 10 MG/1
10 TABLET ORAL DAILY
Qty: 30 TABLET | Refills: 3 | Status: SHIPPED | OUTPATIENT
Start: 2024-08-21
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
## 2024-09-02 DIAGNOSIS — G43.819 OTHER MIGRAINE WITHOUT STATUS MIGRAINOSUS, INTRACTABLE: Primary | ICD-10-CM

## 2024-09-04 RX ORDER — BUTALBITAL, ACETAMINOPHEN AND CAFFEINE 50; 325; 40 MG/1; MG/1; MG/1
TABLET ORAL
Qty: 30 TABLET | Refills: 2 | Status: SHIPPED | OUTPATIENT
Start: 2024-09-04

## 2024-09-18 ENCOUNTER — OFFICE VISIT (OUTPATIENT)
Dept: CARDIOLOGY | Age: 54
End: 2024-09-18
Payer: COMMERCIAL

## 2024-09-18 VITALS
HEIGHT: 67 IN | RESPIRATION RATE: 18 BRPM | OXYGEN SATURATION: 98 % | DIASTOLIC BLOOD PRESSURE: 81 MMHG | WEIGHT: 200 LBS | BODY MASS INDEX: 31.39 KG/M2 | SYSTOLIC BLOOD PRESSURE: 113 MMHG | HEART RATE: 93 BPM

## 2024-09-18 DIAGNOSIS — I10 ESSENTIAL HYPERTENSION: ICD-10-CM

## 2024-09-18 DIAGNOSIS — R00.0 TACHYCARDIA: Primary | ICD-10-CM

## 2024-09-18 DIAGNOSIS — R93.1 ABNORMAL ECHOCARDIOGRAM: ICD-10-CM

## 2024-09-18 DIAGNOSIS — R07.89 CHEST TIGHTNESS: ICD-10-CM

## 2024-09-18 DIAGNOSIS — G47.33 OSA (OBSTRUCTIVE SLEEP APNEA): ICD-10-CM

## 2024-09-18 DIAGNOSIS — E78.2 MIXED HYPERLIPIDEMIA: ICD-10-CM

## 2024-09-18 DIAGNOSIS — E66.9 OBESITY (BMI 30.0-34.9): ICD-10-CM

## 2024-09-18 PROCEDURE — 3079F DIAST BP 80-89 MM HG: CPT | Performed by: INTERNAL MEDICINE

## 2024-09-18 PROCEDURE — 3074F SYST BP LT 130 MM HG: CPT | Performed by: INTERNAL MEDICINE

## 2024-09-18 PROCEDURE — 99214 OFFICE O/P EST MOD 30 MIN: CPT | Performed by: INTERNAL MEDICINE

## 2024-10-08 ENCOUNTER — OFFICE VISIT (OUTPATIENT)
Dept: PRIMARY CARE CLINIC | Age: 54
End: 2024-10-08
Payer: COMMERCIAL

## 2024-10-08 VITALS
HEIGHT: 67 IN | SYSTOLIC BLOOD PRESSURE: 118 MMHG | WEIGHT: 198.6 LBS | BODY MASS INDEX: 31.17 KG/M2 | OXYGEN SATURATION: 97 % | DIASTOLIC BLOOD PRESSURE: 82 MMHG | HEART RATE: 90 BPM

## 2024-10-08 DIAGNOSIS — E03.9 HYPOTHYROIDISM, UNSPECIFIED TYPE: Primary | ICD-10-CM

## 2024-10-08 DIAGNOSIS — I10 ESSENTIAL HYPERTENSION: ICD-10-CM

## 2024-10-08 DIAGNOSIS — E78.5 HYPERLIPIDEMIA, UNSPECIFIED HYPERLIPIDEMIA TYPE: ICD-10-CM

## 2024-10-08 DIAGNOSIS — E66.9 OBESITY (BMI 30-39.9): ICD-10-CM

## 2024-10-08 PROCEDURE — G2211 COMPLEX E/M VISIT ADD ON: HCPCS | Performed by: STUDENT IN AN ORGANIZED HEALTH CARE EDUCATION/TRAINING PROGRAM

## 2024-10-08 PROCEDURE — 3074F SYST BP LT 130 MM HG: CPT | Performed by: STUDENT IN AN ORGANIZED HEALTH CARE EDUCATION/TRAINING PROGRAM

## 2024-10-08 PROCEDURE — 99214 OFFICE O/P EST MOD 30 MIN: CPT | Performed by: STUDENT IN AN ORGANIZED HEALTH CARE EDUCATION/TRAINING PROGRAM

## 2024-10-08 PROCEDURE — 3079F DIAST BP 80-89 MM HG: CPT | Performed by: STUDENT IN AN ORGANIZED HEALTH CARE EDUCATION/TRAINING PROGRAM

## 2024-10-08 RX ORDER — TIRZEPATIDE 2.5 MG/.5ML
2.5 INJECTION, SOLUTION SUBCUTANEOUS WEEKLY
Qty: 4 ML | Refills: 1 | Status: SHIPPED | OUTPATIENT
Start: 2024-10-08

## 2024-10-08 NOTE — PROGRESS NOTES
[facility]  [address] [place] [address] , [address]  [place], [place], [address]    Lazaro Pearson is a 54 y.o. female with  has a past medical history of Anxiety, Depression, Fibromyalgia, Headache, Hyperlipidemia, Hypertension, Hypothyroidism, Insomnia, Irritable bowel syndrome, and Sleep apnea.  Presented to the office today for:  Chief Complaint   Patient presents with    Weight Management       Assessment/Plan   1. Hypothyroidism, unspecified type  2. Obesity (BMI 30-39.9)  -     Tirzepatide-Weight Management (ZEPBOUND) 2.5 MG/0.5ML SOAJ; Inject 2.5 mg into the skin once a week, Disp-4 mL, R-1Normal  3. Hyperlipidemia, unspecified hyperlipidemia type  -     Tirzepatide-Weight Management (ZEPBOUND) 2.5 MG/0.5ML SOAJ; Inject 2.5 mg into the skin once a week, Disp-4 mL, R-1Normal  4. Essential hypertension  -     Tirzepatide-Weight Management (ZEPBOUND) 2.5 MG/0.5ML SOAJ; Inject 2.5 mg into the skin once a week, Disp-4 mL, R-1Normal  Return in about 1 month (around 11/8/2024) for F/U Wt.    Discussed monitoring caloric intake, 1500 kcals/day  Exercise 150+ minutes/week ,moderate-high intensity  Discussed various options for weight loss (e.g. depending on coverage)  Discussed sleep hygiene - currently getting a device completed   Continue w/ synthroid at 150 mcg for now, repeat labs are pending     All patient questions answered.  Pt voiced understanding.   Medications Discontinued During This Encounter   Medication Reason    lactulose (CHRONULAC) 10 GM/15ML solution LIST CLEANUP       Patient received counseling on the following healthy behaviors: nutrition, exercise and medication adherence. I encouraged and discussed lifestyle modifications including diet and exercise (150+ minutes of moderate-high intensity) and the patient was agreeable to making positive/beneficial changes to both to help improve their overall health. Discussed use, benefit, and side effects of prescribed medications.  Barriers

## 2024-10-16 ENCOUNTER — OFFICE VISIT (OUTPATIENT)
Dept: PRIMARY CARE CLINIC | Age: 54
End: 2024-10-16
Payer: COMMERCIAL

## 2024-10-16 ENCOUNTER — HOSPITAL ENCOUNTER (OUTPATIENT)
Age: 54
Discharge: HOME | End: 2024-10-16
Payer: COMMERCIAL

## 2024-10-16 VITALS
SYSTOLIC BLOOD PRESSURE: 110 MMHG | HEART RATE: 98 BPM | DIASTOLIC BLOOD PRESSURE: 80 MMHG | BODY MASS INDEX: 31.17 KG/M2 | TEMPERATURE: 96.9 F | WEIGHT: 199 LBS | OXYGEN SATURATION: 96 %

## 2024-10-16 DIAGNOSIS — I10 ESSENTIAL HYPERTENSION: Primary | ICD-10-CM

## 2024-10-16 DIAGNOSIS — Z23 NEED FOR VACCINATION: ICD-10-CM

## 2024-10-16 DIAGNOSIS — F41.9 ANXIETY: ICD-10-CM

## 2024-10-16 DIAGNOSIS — J45.20 MILD INTERMITTENT ASTHMA WITHOUT COMPLICATION: ICD-10-CM

## 2024-10-16 DIAGNOSIS — E03.8 OTHER SPECIFIED HYPOTHYROIDISM: ICD-10-CM

## 2024-10-16 DIAGNOSIS — L98.9 SKIN LESION: ICD-10-CM

## 2024-10-16 DIAGNOSIS — F32.5 DEPRESSION, MAJOR, IN REMISSION (HCC): ICD-10-CM

## 2024-10-16 PROBLEM — R05.3 CHRONIC COUGH: Status: RESOLVED | Noted: 2017-07-14 | Resolved: 2024-10-16

## 2024-10-16 PROBLEM — J45.909 ASTHMA: Status: ACTIVE | Noted: 2024-10-16

## 2024-10-16 PROBLEM — Z13.31 POSITIVE DEPRESSION SCREENING: Status: RESOLVED | Noted: 2018-11-06 | Resolved: 2024-10-16

## 2024-10-16 LAB
T4 FREE SERPL-MCNC: 1.5 NG/DL (ref 0.92–1.68)
TSH SERPL DL<=0.05 MIU/L-ACNC: 7.76 UIU/ML (ref 0.27–4.2)

## 2024-10-16 PROCEDURE — 36415 COLL VENOUS BLD VENIPUNCTURE: CPT

## 2024-10-16 PROCEDURE — 3074F SYST BP LT 130 MM HG: CPT | Performed by: NURSE PRACTITIONER

## 2024-10-16 PROCEDURE — 99214 OFFICE O/P EST MOD 30 MIN: CPT | Performed by: NURSE PRACTITIONER

## 2024-10-16 PROCEDURE — 3079F DIAST BP 80-89 MM HG: CPT | Performed by: NURSE PRACTITIONER

## 2024-10-16 PROCEDURE — 84443 ASSAY THYROID STIM HORMONE: CPT

## 2024-10-16 PROCEDURE — 90471 IMMUNIZATION ADMIN: CPT | Performed by: NURSE PRACTITIONER

## 2024-10-16 PROCEDURE — 90677 PCV20 VACCINE IM: CPT | Performed by: NURSE PRACTITIONER

## 2024-10-16 PROCEDURE — 84439 ASSAY OF FREE THYROXINE: CPT

## 2024-10-16 NOTE — PATIENT INSTRUCTIONS
in Pashto and other languages. See www.immunize.org/vis  Hojas de información sobre vacunas están disponibles en español y en muchos otros idiomas. Visite www.immunize.org/vis  Care instructions adapted under license by [employer]. If you have questions about a medical condition or this instruction, always ask your healthcare professional. Healthwise, [facility] disclaims any warranty or liability for your use of this information.

## 2024-10-16 NOTE — PROGRESS NOTES
Name: Lazaro Pearson  : 1970         Chief Complaint:     Chief Complaint   Patient presents with    depression anxiety     - Stable   - Following with psychiatry at [facility] every 3 months   - Current treatment cymbalta 60mg QD and mirtazapine 45mg QD       Tachycardia     - Stable   - Tolerating metoprolol succinate 50mg QD  - Following with [facility], just had a follow up a few weeks ago      Weight Management     - Consulting with Dr. Hyde for weight management     Constipation     - Chronic  - Patient unable to tolerate MiraLAX due to side effects  - She has trialed several over-the-counter treatments for constipation such as fiber supplements and Colace with no relief  - Patient had a consult with [facility] (Todd)   - Discussed options for medication initiation, such as Linzess, though patient hesitant to start another daily medication. Will await GI consult   - Pt is not feeling positive about treatment       Skin check     -Pt has a odd spot on her left shoulder  -She is asking for a derm referral due to age       History of Present Illness:      Lazaro Pearson is a 54 y.o.  female who presents with depression anxiety (- Stable /- Following with psychiatry at [facility] every 3 months /- Current treatment cymbalta 60mg QD and mirtazapine 45mg QD /), Tachycardia (- Stable /- Tolerating metoprolol succinate 50mg QD/- Following with [facility], just had a follow up a few weeks ago/), Weight Management (- Consulting with Dr. Hyde for weight management ), Constipation (- Chronic/- Patient unable to tolerate MiraLAX due to side effects/- She has trialed several over-the-counter treatments for constipation such as fiber supplements and Colace with no relief/- Patient had a consult with [facility] (T[facility]) /- Discussed options for medication initiation, such as Linzess, though patient hesitant to start another daily medication. Will await GI consult /- Pt is not feeling positive about treatment

## 2024-10-17 RX ORDER — LEVOTHYROXINE SODIUM 175 UG/1
175 TABLET ORAL DAILY
Qty: 30 TABLET | Refills: 5 | Status: SHIPPED | OUTPATIENT
Start: 2024-10-17

## 2024-11-18 RX ORDER — ATORVASTATIN CALCIUM 40 MG/1
TABLET, FILM COATED ORAL
Qty: 90 TABLET | Refills: 3 | Status: SHIPPED | OUTPATIENT
Start: 2024-11-18

## 2024-12-19 ENCOUNTER — HOSPITAL ENCOUNTER (OUTPATIENT)
Dept: CT IMAGING | Age: 54
Discharge: HOME | End: 2024-12-21
Attending: STUDENT IN AN ORGANIZED HEALTH CARE EDUCATION/TRAINING PROGRAM
Payer: COMMERCIAL

## 2024-12-19 ENCOUNTER — HOSPITAL ENCOUNTER (OUTPATIENT)
Age: 54
Discharge: HOME | End: 2024-12-19
Attending: STUDENT IN AN ORGANIZED HEALTH CARE EDUCATION/TRAINING PROGRAM
Payer: COMMERCIAL

## 2024-12-19 DIAGNOSIS — E03.8 OTHER SPECIFIED HYPOTHYROIDISM: ICD-10-CM

## 2024-12-19 DIAGNOSIS — J98.4 SCARRING OF LUNG: ICD-10-CM

## 2024-12-19 LAB — TSH SERPL DL<=0.05 MIU/L-ACNC: 2.11 UIU/ML (ref 0.27–4.2)

## 2024-12-19 PROCEDURE — 71250 CT THORAX DX C-: CPT

## 2024-12-19 PROCEDURE — 84443 ASSAY THYROID STIM HORMONE: CPT

## 2024-12-19 PROCEDURE — 36415 COLL VENOUS BLD VENIPUNCTURE: CPT

## 2025-01-29 RX ORDER — HYDROCHLOROTHIAZIDE 25 MG/1
TABLET ORAL
Qty: 90 TABLET | Refills: 3 | Status: SHIPPED | OUTPATIENT
Start: 2025-01-29

## 2025-02-10 RX ORDER — LEVOTHYROXINE SODIUM 175 UG/1
175 TABLET ORAL DAILY
Qty: 90 TABLET | Refills: 1 | OUTPATIENT
Start: 2025-02-10

## 2025-02-14 SDOH — ECONOMIC STABILITY: FOOD INSECURITY: WITHIN THE PAST 12 MONTHS, THE FOOD YOU BOUGHT JUST DIDN'T LAST AND YOU DIDN'T HAVE MONEY TO GET MORE.: NEVER TRUE

## 2025-02-14 SDOH — ECONOMIC STABILITY: FOOD INSECURITY: WITHIN THE PAST 12 MONTHS, YOU WORRIED THAT YOUR FOOD WOULD RUN OUT BEFORE YOU GOT MONEY TO BUY MORE.: NEVER TRUE

## 2025-02-14 SDOH — ECONOMIC STABILITY: INCOME INSECURITY: IN THE LAST 12 MONTHS, WAS THERE A TIME WHEN YOU WERE NOT ABLE TO PAY THE MORTGAGE OR RENT ON TIME?: NO

## 2025-02-14 ASSESSMENT — PATIENT HEALTH QUESTIONNAIRE - PHQ9
9. THOUGHTS THAT YOU WOULD BE BETTER OFF DEAD, OR OF HURTING YOURSELF: NOT AT ALL
10. IF YOU CHECKED OFF ANY PROBLEMS, HOW DIFFICULT HAVE THESE PROBLEMS MADE IT FOR YOU TO DO YOUR WORK, TAKE CARE OF THINGS AT HOME, OR GET ALONG WITH OTHER PEOPLE: SOMEWHAT DIFFICULT
3. TROUBLE FALLING OR STAYING ASLEEP: NOT AT ALL
1. LITTLE INTEREST OR PLEASURE IN DOING THINGS: MORE THAN HALF THE DAYS
SUM OF ALL RESPONSES TO PHQ QUESTIONS 1-9: 8
9. THOUGHTS THAT YOU WOULD BE BETTER OFF DEAD, OR OF HURTING YOURSELF: NOT AT ALL
SUM OF ALL RESPONSES TO PHQ QUESTIONS 1-9: 8
SUM OF ALL RESPONSES TO PHQ QUESTIONS 1-9: 8
5. POOR APPETITE OR OVEREATING: NOT AT ALL
5. POOR APPETITE OR OVEREATING: NOT AT ALL
6. FEELING BAD ABOUT YOURSELF - OR THAT YOU ARE A FAILURE OR HAVE LET YOURSELF OR YOUR FAMILY DOWN: SEVERAL DAYS
2. FEELING DOWN, DEPRESSED OR HOPELESS: MORE THAN HALF THE DAYS
SUM OF ALL RESPONSES TO PHQ QUESTIONS 1-9: 8
4. FEELING TIRED OR HAVING LITTLE ENERGY: MORE THAN HALF THE DAYS
3. TROUBLE FALLING OR STAYING ASLEEP: NOT AT ALL
6. FEELING BAD ABOUT YOURSELF - OR THAT YOU ARE A FAILURE OR HAVE LET YOURSELF OR YOUR FAMILY DOWN: SEVERAL DAYS
8. MOVING OR SPEAKING SO SLOWLY THAT OTHER PEOPLE COULD HAVE NOTICED. OR THE OPPOSITE, BEING SO FIGETY OR RESTLESS THAT YOU HAVE BEEN MOVING AROUND A LOT MORE THAN USUAL: NOT AT ALL
SUM OF ALL RESPONSES TO PHQ9 QUESTIONS 1 & 2: 4
7. TROUBLE CONCENTRATING ON THINGS, SUCH AS READING THE NEWSPAPER OR WATCHING TELEVISION: SEVERAL DAYS
2. FEELING DOWN, DEPRESSED OR HOPELESS: MORE THAN HALF THE DAYS
7. TROUBLE CONCENTRATING ON THINGS, SUCH AS READING THE NEWSPAPER OR WATCHING TELEVISION: SEVERAL DAYS
SUM OF ALL RESPONSES TO PHQ QUESTIONS 1-9: 8
1. LITTLE INTEREST OR PLEASURE IN DOING THINGS: MORE THAN HALF THE DAYS
10. IF YOU CHECKED OFF ANY PROBLEMS, HOW DIFFICULT HAVE THESE PROBLEMS MADE IT FOR YOU TO DO YOUR WORK, TAKE CARE OF THINGS AT HOME, OR GET ALONG WITH OTHER PEOPLE: SOMEWHAT DIFFICULT
8. MOVING OR SPEAKING SO SLOWLY THAT OTHER PEOPLE COULD HAVE NOTICED. OR THE OPPOSITE - BEING SO FIDGETY OR RESTLESS THAT YOU HAVE BEEN MOVING AROUND A LOT MORE THAN USUAL: NOT AT ALL
4. FEELING TIRED OR HAVING LITTLE ENERGY: MORE THAN HALF THE DAYS

## 2025-02-17 ENCOUNTER — OFFICE VISIT (OUTPATIENT)
Dept: PRIMARY CARE CLINIC | Age: 55
End: 2025-02-17
Payer: COMMERCIAL

## 2025-02-17 VITALS
BODY MASS INDEX: 30.07 KG/M2 | WEIGHT: 192 LBS | SYSTOLIC BLOOD PRESSURE: 110 MMHG | HEART RATE: 93 BPM | OXYGEN SATURATION: 97 % | DIASTOLIC BLOOD PRESSURE: 70 MMHG | TEMPERATURE: 96.8 F

## 2025-02-17 DIAGNOSIS — F41.9 ANXIETY: ICD-10-CM

## 2025-02-17 DIAGNOSIS — E03.9 HYPOTHYROIDISM, UNSPECIFIED TYPE: ICD-10-CM

## 2025-02-17 DIAGNOSIS — I10 ESSENTIAL HYPERTENSION: Primary | ICD-10-CM

## 2025-02-17 DIAGNOSIS — R00.0 TACHYCARDIA: ICD-10-CM

## 2025-02-17 DIAGNOSIS — Z00.00 WELLNESS EXAMINATION: ICD-10-CM

## 2025-02-17 PROCEDURE — 3074F SYST BP LT 130 MM HG: CPT | Performed by: NURSE PRACTITIONER

## 2025-02-17 PROCEDURE — 3078F DIAST BP <80 MM HG: CPT | Performed by: NURSE PRACTITIONER

## 2025-02-17 PROCEDURE — 99214 OFFICE O/P EST MOD 30 MIN: CPT | Performed by: NURSE PRACTITIONER

## 2025-02-17 RX ORDER — LEVOTHYROXINE SODIUM 175 UG/1
175 TABLET ORAL DAILY
Qty: 90 TABLET | Refills: 3 | Status: SHIPPED | OUTPATIENT
Start: 2025-02-17

## 2025-02-17 RX ORDER — METOPROLOL SUCCINATE 100 MG/1
100 TABLET, EXTENDED RELEASE ORAL DAILY
Qty: 90 TABLET | Refills: 3 | Status: SHIPPED | OUTPATIENT
Start: 2025-02-17

## 2025-02-17 RX ORDER — FENOFIBRATE 145 MG/1
145 TABLET, COATED ORAL DAILY
Qty: 90 TABLET | Refills: 3 | Status: SHIPPED | OUTPATIENT
Start: 2025-02-17

## 2025-02-17 NOTE — PROGRESS NOTES
Name: Lazaro Pearson  : 1970         Chief Complaint:     Chief Complaint   Patient presents with    Hypertension     -admits to rapid heart rate, on and off for about a month  -denies taking blood pressure at home  -denies exercise  -admits palpitations  -denies dizziness, chest pain    Anxiety     -she admits anxiety is elevated   -she is working with a therapist  -she questions Semaglutide may have a cause in anxiety being elevated    Hypothyroidism     -needs a refill        History of Present Illness:      Lazaro Pearson is a 54 y.o.  female who presents with Hypertension (-admits to rapid heart rate, on and off for about a month/-denies taking blood pressure at home/-denies exercise/-admits palpitations/-denies dizziness, chest pain), Anxiety (-she admits anxiety is elevated /-she is working with a therapist/-she questions Semaglutide may have a cause in anxiety being elevated), and Hypothyroidism (-needs a refill )      HPI    Tachycardia:  Patient has known history of tachycardia, following with [facility].  Most recent cardiology appointment 2024, note reviewed recommended continuing metoprolol succinate 100 mg daily.  She states within the last months she has had intermittent rapid heart rate.  She does not take her blood pressure at home.  Admits palpitations.  Denies dizziness or chest pain.     Allergic:  Admits worsening allergic response that presents as \"pressure in her throat\". She was prescribed singulair by [facility] which has been helpful.     Anxiety:  Following with psychiatry Dr. Holm ([facility]). Upcoming appt next month. She is continuing with see counselor every other week. Admits decreased motivation, worse since starting wegovy.     Hypothyroidism:  Current treatment includes levothyroxine 175 mcg daily. Most recent TSH 24 WNL 2.11.     Obesity:  Taking wegovy through  [facility]. She is seeking second opinion in [place] regarding this

## 2025-03-13 ENCOUNTER — OFFICE VISIT (OUTPATIENT)
Dept: PULMONOLOGY | Age: 55
End: 2025-03-13
Payer: COMMERCIAL

## 2025-03-13 VITALS
OXYGEN SATURATION: 93 % | WEIGHT: 192.7 LBS | HEART RATE: 94 BPM | DIASTOLIC BLOOD PRESSURE: 67 MMHG | RESPIRATION RATE: 20 BRPM | TEMPERATURE: 97.9 F | SYSTOLIC BLOOD PRESSURE: 104 MMHG | BODY MASS INDEX: 30.25 KG/M2 | HEIGHT: 67 IN

## 2025-03-13 DIAGNOSIS — J45.20 MILD INTERMITTENT ASTHMA WITHOUT COMPLICATION: ICD-10-CM

## 2025-03-13 DIAGNOSIS — R05.3 CHRONIC COUGH: ICD-10-CM

## 2025-03-13 DIAGNOSIS — G47.33 OSA (OBSTRUCTIVE SLEEP APNEA): Primary | ICD-10-CM

## 2025-03-13 PROCEDURE — 3078F DIAST BP <80 MM HG: CPT | Performed by: STUDENT IN AN ORGANIZED HEALTH CARE EDUCATION/TRAINING PROGRAM

## 2025-03-13 PROCEDURE — 99213 OFFICE O/P EST LOW 20 MIN: CPT | Performed by: STUDENT IN AN ORGANIZED HEALTH CARE EDUCATION/TRAINING PROGRAM

## 2025-03-13 PROCEDURE — 3074F SYST BP LT 130 MM HG: CPT | Performed by: STUDENT IN AN ORGANIZED HEALTH CARE EDUCATION/TRAINING PROGRAM

## 2025-03-13 NOTE — PATIENT INSTRUCTIONS
SURVEY:    Thank you for allowing us to care for you today.    You may be receiving a survey from [facility] regarding your visit today- electronically or via mail.      Please help us by completing the survey as this will provide the needed feedback to ensure we are providing the very best care for you and your family.    If you cannot score us a very good on any question, please call the office to discuss how we could have made your experience a very good one.    Thank you.       STAFF:    Keisha Encarnacion, Marisol QURESHI      CLINICAL STAFF:    Eufemia PATRICK, Rebeca QURESHI, Briseyda QURESHI, Karyna PATRICK

## 2025-03-14 NOTE — PROGRESS NOTES
[facility]  Office visit follow-up  ______________________________________________________________________________    Patient: Lazaro Pearson  YOB: 1970   MRN: [id number]    Acct: [id number]     Today's date: 03/13/25    Chief complaint   Follow up     History of present illness    54 y.o. female with PMHx of hypertension, hyperlipidemia, obesity, DANNY treated with mouthpiece, anxiety/depression presented today to the pulmonary clinic for regular follow-up.    The patient was last time seen on 8/21/2024.     Pulmonary workup:  PFT 4/30/2024: Was overall with no obstructive versus restrictive defect with normal FEV1/FVC, DLCO, lung volumes.            CT chest from 4/30/2024 showed  There is patchy bilateral subpleural ground-glass attenuation extending along  the the posteromedial lower lobes of variable thickness up to 1 cm  cross-sectional thickness.  Slightly more pronounced on the right.  This  involves the lower half of the lower lobes.  Most suggestive of subsegmental  atelectasis/scarring.  No prior CT imaging for comparison.      Interval history: 03/13/25  History of Present Illness  The patient presents for evaluation of a cough/DANNY.    She reports an improvement in her cough, which she no longer perceives as a significant issue. The cough was initially associated with residual effects from influenza, presenting as a dry, hacking cough. She has been using Singulair intermittently, taking it for a month and then discontinuing its use to assess her condition. Currently, she is on Singulair. She has not required the use of albuterol inhaler.  She is compliant with using mouthpiece for DANNY.                   ROS:     Constitutional:no fever, no chills  HEENT: no runny nose, no sore throat  Respiratory: no dyspnea, no cough, no wheeze, no sputum production  CVS: no chest pain, no palpitations, no syncope  Gi: no abdominal pain, no nausea, no vomiting, no

## 2025-04-07 ENCOUNTER — TELEPHONE (OUTPATIENT)
Dept: PRIMARY CARE CLINIC | Age: 55
End: 2025-04-07

## 2025-04-07 NOTE — TELEPHONE ENCOUNTER
Pt. Sent [employer] message and this writer forward it to Felipe Dowling CNP. He stated he would like to have her evaluated. Called pt. And left voicemail. Need to schedule appt. To evualate pt. Due to her concerns.

## 2025-04-10 ENCOUNTER — HOSPITAL ENCOUNTER (OUTPATIENT)
Age: 55
Discharge: HOME | End: 2025-04-10
Payer: COMMERCIAL

## 2025-04-10 ENCOUNTER — OFFICE VISIT (OUTPATIENT)
Dept: PRIMARY CARE CLINIC | Age: 55
End: 2025-04-10
Payer: COMMERCIAL

## 2025-04-10 VITALS
DIASTOLIC BLOOD PRESSURE: 80 MMHG | WEIGHT: 185.6 LBS | BODY MASS INDEX: 29.07 KG/M2 | SYSTOLIC BLOOD PRESSURE: 108 MMHG | OXYGEN SATURATION: 98 % | HEART RATE: 84 BPM

## 2025-04-10 DIAGNOSIS — Z00.00 WELLNESS EXAMINATION: ICD-10-CM

## 2025-04-10 DIAGNOSIS — R19.7 DIARRHEA, UNSPECIFIED TYPE: Primary | ICD-10-CM

## 2025-04-10 DIAGNOSIS — R11.2 NAUSEA AND VOMITING, UNSPECIFIED VOMITING TYPE: ICD-10-CM

## 2025-04-10 LAB
ALT SERPL-CCNC: 36 U/L (ref 10–35)
ANION GAP SERPL CALCULATED.3IONS-SCNC: 12 MMOL/L (ref 9–16)
AST SERPL-CCNC: 36 U/L (ref 10–35)
BUN SERPL-MCNC: 21 MG/DL (ref 6–20)
BUN/CREAT SERPL: 21 (ref 9–20)
CALCIUM SERPL-MCNC: 10.3 MG/DL (ref 8.6–10.4)
CHLORIDE SERPL-SCNC: 96 MMOL/L (ref 98–107)
CO2 SERPL-SCNC: 30 MMOL/L (ref 20–31)
CREAT SERPL-MCNC: 1 MG/DL (ref 0.5–0.9)
ERYTHROCYTE [DISTWIDTH] IN BLOOD BY AUTOMATED COUNT: 12.3 % (ref 11.8–14.4)
GFR, ESTIMATED: 65 ML/MIN/1.73M2
GLUCOSE SERPL-MCNC: 93 MG/DL (ref 74–99)
HCT VFR BLD AUTO: 38 % (ref 36.3–47.1)
HGB BLD-MCNC: 13.3 G/DL (ref 11.9–15.1)
MCH RBC QN AUTO: 31.1 PG (ref 25.2–33.5)
MCHC RBC AUTO-ENTMCNC: 35 G/DL (ref 28.4–34.8)
MCV RBC AUTO: 88.8 FL (ref 82.6–102.9)
NRBC BLD-RTO: 0 PER 100 WBC
PLATELET # BLD AUTO: 360 K/UL (ref 138–453)
PMV BLD AUTO: 10.8 FL (ref 8.1–13.5)
POTASSIUM SERPL-SCNC: 3.6 MMOL/L (ref 3.7–5.3)
RBC # BLD AUTO: 4.28 M/UL (ref 3.95–5.11)
SODIUM SERPL-SCNC: 138 MMOL/L (ref 136–145)
TSH SERPL DL<=0.05 MIU/L-ACNC: 0.53 UIU/ML (ref 0.27–4.2)
WBC OTHER # BLD: 9 K/UL (ref 3.5–11.3)

## 2025-04-10 PROCEDURE — 84450 TRANSFERASE (AST) (SGOT): CPT

## 2025-04-10 PROCEDURE — 80061 LIPID PANEL: CPT

## 2025-04-10 PROCEDURE — 85027 COMPLETE CBC AUTOMATED: CPT

## 2025-04-10 PROCEDURE — 84443 ASSAY THYROID STIM HORMONE: CPT

## 2025-04-10 PROCEDURE — 3079F DIAST BP 80-89 MM HG: CPT | Performed by: NURSE PRACTITIONER

## 2025-04-10 PROCEDURE — 99214 OFFICE O/P EST MOD 30 MIN: CPT | Performed by: NURSE PRACTITIONER

## 2025-04-10 PROCEDURE — 84460 ALANINE AMINO (ALT) (SGPT): CPT

## 2025-04-10 PROCEDURE — 82306 VITAMIN D 25 HYDROXY: CPT

## 2025-04-10 PROCEDURE — 80048 BASIC METABOLIC PNL TOTAL CA: CPT

## 2025-04-10 PROCEDURE — G2211 COMPLEX E/M VISIT ADD ON: HCPCS | Performed by: NURSE PRACTITIONER

## 2025-04-10 PROCEDURE — 36415 COLL VENOUS BLD VENIPUNCTURE: CPT

## 2025-04-10 PROCEDURE — 3074F SYST BP LT 130 MM HG: CPT | Performed by: NURSE PRACTITIONER

## 2025-04-10 PROCEDURE — 83036 HEMOGLOBIN GLYCOSYLATED A1C: CPT

## 2025-04-10 RX ORDER — ONDANSETRON 4 MG/1
4 TABLET, ORALLY DISINTEGRATING ORAL 3 TIMES DAILY PRN
Qty: 21 TABLET | Refills: 0 | Status: SHIPPED | OUTPATIENT
Start: 2025-04-10

## 2025-04-10 RX ORDER — DULOXETIN HYDROCHLORIDE 60 MG/1
60 CAPSULE, DELAYED RELEASE ORAL DAILY
COMMUNITY
Start: 2025-03-13

## 2025-04-10 ASSESSMENT — PATIENT HEALTH QUESTIONNAIRE - PHQ9
2. FEELING DOWN, DEPRESSED OR HOPELESS: NOT AT ALL
SUM OF ALL RESPONSES TO PHQ QUESTIONS 1-9: 0
1. LITTLE INTEREST OR PLEASURE IN DOING THINGS: NOT AT ALL

## 2025-04-10 ASSESSMENT — ENCOUNTER SYMPTOMS
SHORTNESS OF BREATH: 0
ABDOMINAL PAIN: 1
CHEST TIGHTNESS: 0
NAUSEA: 1
ANAL BLEEDING: 0
DIARRHEA: 1
BLOOD IN STOOL: 0
VOMITING: 1
ABDOMINAL DISTENTION: 1
CONSTIPATION: 1
WHEEZING: 0
COUGH: 0

## 2025-04-10 NOTE — PROGRESS NOTES
CNP   montelukast (SINGULAIR) 10 MG tablet Take 1 tablet by mouth daily Yes Ankita Mazariegos MD   losartan (COZAAR) 100 MG tablet TAKE 1 TABLET BY MOUTH EVERY DAY Yes Sapphire Romero APRN - CNP   albuterol sulfate HFA (VENTOLIN HFA) 108 (90 Base) MCG/ACT inhaler Inhale 2 puffs into the lungs every 4 hours as needed for Wheezing or Shortness of Breath Yes Sapphire Romero APRN - CNP   mirtazapine (REMERON) 45 MG tablet Take 1 tablet by mouth nightly Yes Sapphire Romero APRN - CNP   DULOXETINE HCL PO Take 60 mg by mouth daily Yes ProviderJulien MD   ALPRAZolam (XANAX) 0.25 MG tablet Take 1 tablet by mouth as needed. Yes ProviderJulien MD      Social History     Tobacco Use    Smoking status: Never    Smokeless tobacco: Never   Substance Use Topics    Alcohol use: Yes     Comment: 1 or 2 drinks socially. Average twice a month.      Vitals:    04/10/25 1614   BP: 108/80   Pulse: 84   SpO2: 98%   Weight: 84.2 kg (185 lb 9.6 oz)     Estimated body mass index is 29.07 kg/m² as calculated from the following:    Height as of 3/13/25: 1.702 m (5' 7\").    Weight as of this encounter: 84.2 kg (185 lb 9.6 oz).    Review of Systems   Constitutional:  Positive for chills, diaphoresis and fatigue. Negative for fever.   Respiratory:  Negative for cough, chest tightness, shortness of breath and wheezing.    Cardiovascular:  Negative for chest pain and palpitations.   Gastrointestinal:  Positive for abdominal distention, abdominal pain, constipation, diarrhea, nausea and vomiting. Negative for anal bleeding and blood in stool.   Genitourinary:  Negative for difficulty urinating.   Musculoskeletal:  Positive for myalgias.   Skin:  Negative for rash.   Neurological:  Positive for dizziness. Negative for syncope, weakness, light-headedness and headaches.     Physical Exam  Constitutional:       General: She is not in acute distress.     Appearance: She is not ill-appearing, toxic-appearing or diaphoretic.

## 2025-04-11 ENCOUNTER — RESULTS FOLLOW-UP (OUTPATIENT)
Dept: PRIMARY CARE CLINIC | Age: 55
End: 2025-04-11

## 2025-04-11 LAB
25(OH)D3 SERPL-MCNC: 31.5 NG/ML (ref 30–100)
CHOLEST SERPL-MCNC: 165 MG/DL (ref 0–199)
CHOLESTEROL/HDL RATIO: 4.1
EST. AVERAGE GLUCOSE BLD GHB EST-MCNC: 97 MG/DL
HBA1C MFR BLD: 5 % (ref 4–6)
HDLC SERPL-MCNC: 40 MG/DL
LDLC SERPL CALC-MCNC: 77 MG/DL (ref 0–100)
TRIGL SERPL-MCNC: 240 MG/DL
VLDLC SERPL CALC-MCNC: 48 MG/DL (ref 1–30)

## 2025-04-17 ENCOUNTER — OFFICE VISIT (OUTPATIENT)
Dept: PRIMARY CARE CLINIC | Age: 55
End: 2025-04-17
Payer: COMMERCIAL

## 2025-04-17 VITALS
HEART RATE: 96 BPM | WEIGHT: 184 LBS | DIASTOLIC BLOOD PRESSURE: 62 MMHG | OXYGEN SATURATION: 98 % | SYSTOLIC BLOOD PRESSURE: 82 MMHG | BODY MASS INDEX: 28.82 KG/M2 | TEMPERATURE: 96.8 F

## 2025-04-17 DIAGNOSIS — E87.6 HYPOKALEMIA: ICD-10-CM

## 2025-04-17 DIAGNOSIS — R74.8 ELEVATED LIVER ENZYMES: ICD-10-CM

## 2025-04-17 DIAGNOSIS — Z00.00 WELLNESS EXAMINATION: Primary | ICD-10-CM

## 2025-04-17 DIAGNOSIS — R11.2 NAUSEA AND VOMITING, UNSPECIFIED VOMITING TYPE: ICD-10-CM

## 2025-04-17 DIAGNOSIS — Z12.31 BREAST CANCER SCREENING BY MAMMOGRAM: ICD-10-CM

## 2025-04-17 PROCEDURE — 3074F SYST BP LT 130 MM HG: CPT | Performed by: NURSE PRACTITIONER

## 2025-04-17 PROCEDURE — 3078F DIAST BP <80 MM HG: CPT | Performed by: NURSE PRACTITIONER

## 2025-04-17 PROCEDURE — 99214 OFFICE O/P EST MOD 30 MIN: CPT | Performed by: NURSE PRACTITIONER

## 2025-04-17 PROCEDURE — 99396 PREV VISIT EST AGE 40-64: CPT | Performed by: NURSE PRACTITIONER

## 2025-04-17 RX ORDER — SCOPOLAMINE 1 MG/3D
PATCH, EXTENDED RELEASE TRANSDERMAL
Qty: 4 PATCH | Refills: 0 | Status: SHIPPED | OUTPATIENT
Start: 2025-04-17

## 2025-04-17 RX ORDER — ONDANSETRON 4 MG/1
4 TABLET, ORALLY DISINTEGRATING ORAL 3 TIMES DAILY PRN
Qty: 21 TABLET | Refills: 0 | Status: SHIPPED | OUTPATIENT
Start: 2025-04-17

## 2025-04-17 RX ORDER — IBUPROFEN 800 MG/1
800 TABLET, FILM COATED ORAL EVERY 8 HOURS PRN
Qty: 60 TABLET | Refills: 2 | Status: SHIPPED | OUTPATIENT
Start: 2025-04-17
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
## 2025-05-02 RX ORDER — MONTELUKAST SODIUM 10 MG/1
10 TABLET ORAL DAILY
Qty: 90 TABLET | Refills: 3 | Status: SHIPPED | OUTPATIENT
Start: 2025-05-02

## 2025-05-02 NOTE — TELEPHONE ENCOUNTER
We received a refill request from [facility] for Singulair. Next office visit is 3/18/26. Please advise.

## 2025-05-06 ENCOUNTER — HOSPITAL ENCOUNTER (OUTPATIENT)
Dept: WOMENS IMAGING | Age: 55
Discharge: HOME | End: 2025-05-08
Payer: COMMERCIAL

## 2025-05-06 DIAGNOSIS — Z12.31 BREAST CANCER SCREENING BY MAMMOGRAM: ICD-10-CM

## 2025-05-06 PROCEDURE — 77063 BREAST TOMOSYNTHESIS BI: CPT

## 2025-05-07 ENCOUNTER — RESULTS FOLLOW-UP (OUTPATIENT)
Dept: PRIMARY CARE CLINIC | Age: 55
End: 2025-05-07

## 2025-07-11 ENCOUNTER — HOSPITAL ENCOUNTER (OUTPATIENT)
Age: 55
Discharge: HOME | End: 2025-07-11
Payer: COMMERCIAL

## 2025-07-11 DIAGNOSIS — E87.6 HYPOKALEMIA: ICD-10-CM

## 2025-07-11 DIAGNOSIS — R74.8 ELEVATED LIVER ENZYMES: ICD-10-CM

## 2025-07-11 LAB
ALT SERPL-CCNC: 32 U/L (ref 10–35)
ANION GAP SERPL CALCULATED.3IONS-SCNC: 14 MMOL/L (ref 9–16)
AST SERPL-CCNC: 36 U/L (ref 10–35)
BUN SERPL-MCNC: 21 MG/DL (ref 6–20)
BUN/CREAT SERPL: 21 (ref 9–20)
CALCIUM SERPL-MCNC: 10 MG/DL (ref 8.6–10.4)
CHLORIDE SERPL-SCNC: 103 MMOL/L (ref 98–107)
CO2 SERPL-SCNC: 23 MMOL/L (ref 20–31)
CREAT SERPL-MCNC: 1 MG/DL (ref 0.5–0.9)
GFR, ESTIMATED: 68 ML/MIN/1.73M2
GLUCOSE SERPL-MCNC: 89 MG/DL (ref 74–99)
POTASSIUM SERPL-SCNC: 4.1 MMOL/L (ref 3.7–5.3)
SODIUM SERPL-SCNC: 140 MMOL/L (ref 136–145)

## 2025-07-11 PROCEDURE — 36415 COLL VENOUS BLD VENIPUNCTURE: CPT

## 2025-07-11 PROCEDURE — 84460 ALANINE AMINO (ALT) (SGPT): CPT

## 2025-07-11 PROCEDURE — 84450 TRANSFERASE (AST) (SGOT): CPT

## 2025-07-11 PROCEDURE — 80048 BASIC METABOLIC PNL TOTAL CA: CPT

## 2025-07-15 ENCOUNTER — HOSPITAL ENCOUNTER (OUTPATIENT)
Age: 55
Discharge: HOME | End: 2025-07-15
Payer: COMMERCIAL

## 2025-07-15 ENCOUNTER — OFFICE VISIT (OUTPATIENT)
Dept: PRIMARY CARE CLINIC | Age: 55
End: 2025-07-15
Payer: COMMERCIAL

## 2025-07-15 VITALS
BODY MASS INDEX: 26.78 KG/M2 | SYSTOLIC BLOOD PRESSURE: 100 MMHG | WEIGHT: 171 LBS | DIASTOLIC BLOOD PRESSURE: 80 MMHG | TEMPERATURE: 96.4 F | OXYGEN SATURATION: 96 % | HEART RATE: 85 BPM

## 2025-07-15 DIAGNOSIS — I10 ESSENTIAL HYPERTENSION: ICD-10-CM

## 2025-07-15 DIAGNOSIS — E03.8 OTHER SPECIFIED HYPOTHYROIDISM: ICD-10-CM

## 2025-07-15 DIAGNOSIS — R74.8 ELEVATED LIVER ENZYMES: ICD-10-CM

## 2025-07-15 DIAGNOSIS — E78.2 MIXED HYPERLIPIDEMIA: ICD-10-CM

## 2025-07-15 DIAGNOSIS — E55.9 VITAMIN D DEFICIENCY: ICD-10-CM

## 2025-07-15 DIAGNOSIS — R53.83 OTHER FATIGUE: ICD-10-CM

## 2025-07-15 DIAGNOSIS — F41.9 ANXIETY: Primary | ICD-10-CM

## 2025-07-15 DIAGNOSIS — R73.9 HYPERGLYCEMIA: ICD-10-CM

## 2025-07-15 LAB
25(OH)D3 SERPL-MCNC: 33.9 NG/ML (ref 30–100)
BASOPHILS # BLD: 0.05 K/UL (ref 0–0.2)
BASOPHILS NFR BLD: 1 % (ref 0–2)
EOSINOPHIL # BLD: 0.22 K/UL (ref 0–0.44)
EOSINOPHILS RELATIVE PERCENT: 3 % (ref 1–4)
ERYTHROCYTE [DISTWIDTH] IN BLOOD BY AUTOMATED COUNT: 12.2 % (ref 11.8–14.4)
HAV IGM SERPL QL IA: NONREACTIVE
HBV CORE IGM SERPL QL IA: NONREACTIVE
HBV SURFACE AG SERPL QL IA: NONREACTIVE
HCT VFR BLD AUTO: 38 % (ref 36.3–47.1)
HCV AB SERPL QL IA: NONREACTIVE
HGB BLD-MCNC: 12.9 G/DL (ref 11.9–15.1)
IMM GRANULOCYTES # BLD AUTO: 0.03 K/UL (ref 0–0.3)
IMM GRANULOCYTES NFR BLD: 0 %
IRON SATN MFR SERPL: 12 % (ref 20–55)
IRON SERPL-MCNC: 67 UG/DL (ref 37–145)
LYMPHOCYTES NFR BLD: 2.06 K/UL (ref 1.1–3.7)
LYMPHOCYTES RELATIVE PERCENT: 25 % (ref 24–43)
MAGNESIUM SERPL-MCNC: 1.8 MG/DL (ref 1.6–2.6)
MCH RBC QN AUTO: 30.5 PG (ref 25.2–33.5)
MCHC RBC AUTO-ENTMCNC: 33.9 G/DL (ref 28.4–34.8)
MCV RBC AUTO: 89.8 FL (ref 82.6–102.9)
MONOCYTES NFR BLD: 0.72 K/UL (ref 0.1–1.2)
MONOCYTES NFR BLD: 9 % (ref 3–12)
NEUTROPHILS NFR BLD: 62 % (ref 36–65)
NEUTS SEG NFR BLD: 5.13 K/UL (ref 1.5–8.1)
NRBC BLD-RTO: 0 PER 100 WBC
PLATELET # BLD AUTO: 320 K/UL (ref 138–453)
PMV BLD AUTO: 11 FL (ref 8.1–13.5)
RBC # BLD AUTO: 4.23 M/UL (ref 3.95–5.11)
T4 FREE SERPL-MCNC: 1.9 NG/DL (ref 0.92–1.68)
TIBC SERPL-MCNC: 553 UG/DL (ref 250–450)
TSH SERPL DL<=0.05 MIU/L-ACNC: 0.07 UIU/ML (ref 0.27–4.2)
UNSATURATED IRON BINDING CAPACITY: 486 UG/DL (ref 112–347)
VIT B12 SERPL-MCNC: >2000 PG/ML (ref 232–1245)
WBC OTHER # BLD: 8.2 K/UL (ref 3.5–11.3)

## 2025-07-15 PROCEDURE — 3074F SYST BP LT 130 MM HG: CPT | Performed by: NURSE PRACTITIONER

## 2025-07-15 PROCEDURE — 36415 COLL VENOUS BLD VENIPUNCTURE: CPT

## 2025-07-15 PROCEDURE — 83735 ASSAY OF MAGNESIUM: CPT

## 2025-07-15 PROCEDURE — 82607 VITAMIN B-12: CPT

## 2025-07-15 PROCEDURE — 3079F DIAST BP 80-89 MM HG: CPT | Performed by: NURSE PRACTITIONER

## 2025-07-15 PROCEDURE — 84439 ASSAY OF FREE THYROXINE: CPT

## 2025-07-15 PROCEDURE — 83540 ASSAY OF IRON: CPT

## 2025-07-15 PROCEDURE — 83550 IRON BINDING TEST: CPT

## 2025-07-15 PROCEDURE — 80074 ACUTE HEPATITIS PANEL: CPT

## 2025-07-15 PROCEDURE — 85025 COMPLETE CBC W/AUTO DIFF WBC: CPT

## 2025-07-15 PROCEDURE — 84443 ASSAY THYROID STIM HORMONE: CPT

## 2025-07-15 PROCEDURE — 99214 OFFICE O/P EST MOD 30 MIN: CPT | Performed by: NURSE PRACTITIONER

## 2025-07-15 PROCEDURE — 82306 VITAMIN D 25 HYDROXY: CPT

## 2025-07-15 PROCEDURE — G2211 COMPLEX E/M VISIT ADD ON: HCPCS | Performed by: NURSE PRACTITIONER

## 2025-07-15 NOTE — PROGRESS NOTES
Name: Lazaro Pearson  : 1970         Chief Complaint:     Chief Complaint   Patient presents with    Anxiety     Following with psychiatry Dr. Holm ([facility]).  She is continuing with see counselor every other week. Admits decreased motivation, worse since starting wegovy. She feels she does not have any cognitive markers of depression but does admit to have the behavioral markers. Asking for possible B12 and Vit D labs.    Tachycardia     Patient has known history of tachycardia, [facility].  Current treatment includes metoprolol succinate 100 mg daily.  She admits the intermittent rapid heart rate stable.  She does not take her blood pressure at home. Denies dizziness or chest pain, palpitations, lightheadedness, dizziness or SOB.    Hypothyroidism     Current treatment includes levothyroxine 175 mcg daily. Most recent TSH 4/10/25  WNL 0.53.             History of Present Illness:      Lazaro Pearson is a 55 y.o.  female who presents with Anxiety (Following with psychiatry Dr. Holm ([facility]).  She is continuing with see counselor every other week. Admits decreased motivation, worse since starting wegovy. She feels she does not have any cognitive markers of depression but does admit to have the behavioral markers. Asking for possible B12 and Vit D labs.), Tachycardia (Patient has known history of tachycardia, following with [facility] cardiology.  Current treatment includes metoprolol succinate 100 mg daily.  She admits the intermittent rapid heart rate stable.  She does not take her blood pressure at home. Denies dizziness or chest pain, palpitations, lightheadedness, dizziness or SOB.), and Hypothyroidism (Current treatment includes levothyroxine 175 mcg daily. Most recent TSH 4/10/25  WNL 0.53. / /)      HPI    Hypothyroidism:  Current treatment includes levothyroxine 175mcg QD. 4/10/25 TSH 0.53.     Hypertension:  Current medication regimen includes metoprolol succinate 100 mg

## 2025-08-12 RX ORDER — LOSARTAN POTASSIUM 100 MG/1
100 TABLET ORAL DAILY
Qty: 90 TABLET | Refills: 3 | Status: SHIPPED | OUTPATIENT
Start: 2025-08-12

## 2025-08-28 ENCOUNTER — HOSPITAL ENCOUNTER (OUTPATIENT)
Dept: LAB | Age: 55
Discharge: HOME | End: 2025-08-28
Payer: COMMERCIAL

## 2025-08-28 DIAGNOSIS — E03.9 HYPOTHYROIDISM, UNSPECIFIED TYPE: ICD-10-CM

## 2025-08-28 DIAGNOSIS — R79.0 LOW IRON STORES: ICD-10-CM

## 2025-08-28 LAB
BASOPHILS # BLD: 0.05 K/UL (ref 0–0.2)
BASOPHILS NFR BLD: 1 % (ref 0–2)
EOSINOPHIL # BLD: 0.14 K/UL (ref 0–0.44)
EOSINOPHILS RELATIVE PERCENT: 2 % (ref 1–4)
ERYTHROCYTE [DISTWIDTH] IN BLOOD BY AUTOMATED COUNT: 11.9 % (ref 11.8–14.4)
HCT VFR BLD AUTO: 39.8 % (ref 36.3–47.1)
HGB BLD-MCNC: 13.1 G/DL (ref 11.9–15.1)
IMM GRANULOCYTES # BLD AUTO: <0.03 K/UL (ref 0–0.3)
IMM GRANULOCYTES NFR BLD: 0 %
IRON SATN MFR SERPL: 12 % (ref 20–55)
IRON SERPL-MCNC: 71 UG/DL (ref 37–145)
LYMPHOCYTES NFR BLD: 2.17 K/UL (ref 1.1–3.7)
LYMPHOCYTES RELATIVE PERCENT: 37 % (ref 24–43)
MCH RBC QN AUTO: 30 PG (ref 25.2–33.5)
MCHC RBC AUTO-ENTMCNC: 32.9 G/DL (ref 28.4–34.8)
MCV RBC AUTO: 91.3 FL (ref 82.6–102.9)
MONOCYTES NFR BLD: 0.55 K/UL (ref 0.1–1.2)
MONOCYTES NFR BLD: 9 % (ref 3–12)
NEUTROPHILS NFR BLD: 51 % (ref 36–65)
NEUTS SEG NFR BLD: 2.91 K/UL (ref 1.5–8.1)
NRBC BLD-RTO: 0 PER 100 WBC
PLATELET # BLD AUTO: 376 K/UL (ref 138–453)
PMV BLD AUTO: 10.6 FL (ref 8.1–13.5)
RBC # BLD AUTO: 4.36 M/UL (ref 3.95–5.11)
T4 FREE SERPL-MCNC: 2.1 NG/DL (ref 0.9–1.7)
TIBC SERPL-MCNC: 585 UG/DL (ref 250–450)
TSH SERPL DL<=0.05 MIU/L-ACNC: 0.05 UIU/ML (ref 0.27–4.2)
UNSATURATED IRON BINDING CAPACITY: 514 UG/DL (ref 112–347)
WBC OTHER # BLD: 5.8 K/UL (ref 3.5–11.3)

## 2025-08-28 PROCEDURE — 84439 ASSAY OF FREE THYROXINE: CPT

## 2025-08-28 PROCEDURE — 36415 COLL VENOUS BLD VENIPUNCTURE: CPT

## 2025-08-28 PROCEDURE — 83540 ASSAY OF IRON: CPT

## 2025-08-28 PROCEDURE — 84443 ASSAY THYROID STIM HORMONE: CPT

## 2025-08-28 PROCEDURE — 83550 IRON BINDING TEST: CPT

## 2025-08-28 PROCEDURE — 85025 COMPLETE CBC W/AUTO DIFF WBC: CPT
